# Patient Record
Sex: FEMALE | Race: WHITE | Employment: FULL TIME | URBAN - METROPOLITAN AREA
[De-identification: names, ages, dates, MRNs, and addresses within clinical notes are randomized per-mention and may not be internally consistent; named-entity substitution may affect disease eponyms.]

---

## 2018-05-02 ENCOUNTER — OFFICE VISIT (OUTPATIENT)
Dept: FAMILY MEDICINE CLINIC | Age: 23
End: 2018-05-02
Payer: COMMERCIAL

## 2018-05-02 VITALS
HEIGHT: 64 IN | OXYGEN SATURATION: 100 % | SYSTOLIC BLOOD PRESSURE: 117 MMHG | BODY MASS INDEX: 25.92 KG/M2 | RESPIRATION RATE: 20 BRPM | WEIGHT: 151.8 LBS | TEMPERATURE: 97.2 F | HEART RATE: 80 BPM | DIASTOLIC BLOOD PRESSURE: 73 MMHG

## 2018-05-02 DIAGNOSIS — F90.2 ATTENTION DEFICIT HYPERACTIVITY DISORDER (ADHD), COMBINED TYPE: ICD-10-CM

## 2018-05-02 DIAGNOSIS — R51.9 FREQUENT HEADACHES: ICD-10-CM

## 2018-05-02 DIAGNOSIS — Q74.1 PATELLA DYSPLASIA: ICD-10-CM

## 2018-05-02 DIAGNOSIS — Z23 NEED FOR VACCINATION: ICD-10-CM

## 2018-05-02 DIAGNOSIS — L50.9 URTICARIA: ICD-10-CM

## 2018-05-02 DIAGNOSIS — Z00.00 ANNUAL PHYSICAL EXAM: Primary | ICD-10-CM

## 2018-05-02 DIAGNOSIS — Z11.4 ENCOUNTER FOR SCREENING FOR HIV: ICD-10-CM

## 2018-05-02 DIAGNOSIS — F41.1 GAD (GENERALIZED ANXIETY DISORDER): ICD-10-CM

## 2018-05-02 DIAGNOSIS — T78.3XXA ANGIOEDEMA, INITIAL ENCOUNTER: ICD-10-CM

## 2018-05-02 PROCEDURE — 90471 IMMUNIZATION ADMIN: CPT | Performed by: FAMILY MEDICINE

## 2018-05-02 PROCEDURE — 99385 PREV VISIT NEW AGE 18-39: CPT | Performed by: FAMILY MEDICINE

## 2018-05-02 PROCEDURE — 90715 TDAP VACCINE 7 YRS/> IM: CPT | Performed by: FAMILY MEDICINE

## 2018-05-02 RX ORDER — LIDOCAINE 40 MG/G
CREAM TOPICAL
Qty: 45 G | Refills: 3 | Status: SHIPPED | OUTPATIENT
Start: 2018-05-02 | End: 2018-12-05

## 2018-05-02 RX ORDER — BACLOFEN 10 MG/1
10 TABLET ORAL DAILY
Qty: 30 TABLET | Refills: 1 | Status: SHIPPED | OUTPATIENT
Start: 2018-05-02 | End: 2018-12-05

## 2018-05-02 RX ORDER — EPINEPHRINE 0.3 MG/.3ML
0.3 INJECTION SUBCUTANEOUS PRN
Qty: 0.3 ML | Refills: 3 | Status: SHIPPED | OUTPATIENT
Start: 2018-05-02

## 2018-05-02 RX ORDER — DEXTROAMPHETAMINE SACCHARATE, AMPHETAMINE ASPARTATE MONOHYDRATE, DEXTROAMPHETAMINE SULFATE AND AMPHETAMINE SULFATE 5; 5; 5; 5 MG/1; MG/1; MG/1; MG/1
20 CAPSULE, EXTENDED RELEASE ORAL 2 TIMES DAILY
COMMUNITY
End: 2020-08-14

## 2018-05-02 RX ORDER — EPINEPHRINE 0.3 MG/.3ML
0.3 INJECTION SUBCUTANEOUS PRN
COMMUNITY
End: 2020-03-31 | Stop reason: SDUPTHER

## 2018-05-02 RX ORDER — BUPROPION HYDROCHLORIDE 300 MG/1
300 TABLET ORAL EVERY MORNING
COMMUNITY

## 2018-05-02 ASSESSMENT — PATIENT HEALTH QUESTIONNAIRE - PHQ9
2. FEELING DOWN, DEPRESSED OR HOPELESS: 0
1. LITTLE INTEREST OR PLEASURE IN DOING THINGS: 0
SUM OF ALL RESPONSES TO PHQ QUESTIONS 1-9: 0
SUM OF ALL RESPONSES TO PHQ9 QUESTIONS 1 & 2: 0

## 2018-05-02 ASSESSMENT — ENCOUNTER SYMPTOMS
WHEEZING: 0
DIARRHEA: 0
EYE REDNESS: 0
CONSTIPATION: 0
SINUS PRESSURE: 0
CHEST TIGHTNESS: 0
ABDOMINAL PAIN: 0
SHORTNESS OF BREATH: 0
BACK PAIN: 0
COUGH: 0
ABDOMINAL DISTENTION: 0
PHOTOPHOBIA: 0
SORE THROAT: 0
NAUSEA: 0
SINUS PAIN: 0
RHINORRHEA: 0

## 2018-06-11 ENCOUNTER — HOSPITAL ENCOUNTER (OUTPATIENT)
Age: 23
Discharge: HOME | End: 2018-06-11

## 2018-06-11 LAB — RUBV IGG SER QL: 330.4 IU/ML

## 2018-06-11 PROCEDURE — 86735 MUMPS ANTIBODY: CPT

## 2018-06-11 PROCEDURE — 86762 RUBELLA ANTIBODY: CPT

## 2018-06-11 PROCEDURE — 86787 VARICELLA-ZOSTER ANTIBODY: CPT

## 2018-06-11 PROCEDURE — 86481 TB AG RESPONSE T-CELL SUSP: CPT

## 2018-06-11 PROCEDURE — 86765 RUBEOLA ANTIBODY: CPT

## 2018-06-12 LAB
MEASLES IMMUNE (IGG): 2.53
MUV IGG SER QL: 2.2
VZV IGG SER QL IA: 1.91

## 2018-06-15 LAB — T-SPOT TB TEST: NORMAL

## 2018-11-19 ENCOUNTER — OFFICE VISIT (OUTPATIENT)
Dept: DERMATOLOGY | Age: 23
End: 2018-11-19
Payer: COMMERCIAL

## 2018-11-19 VITALS
HEART RATE: 91 BPM | DIASTOLIC BLOOD PRESSURE: 81 MMHG | OXYGEN SATURATION: 98 % | BODY MASS INDEX: 23.82 KG/M2 | SYSTOLIC BLOOD PRESSURE: 122 MMHG | HEIGHT: 66 IN | WEIGHT: 148.2 LBS

## 2018-11-19 DIAGNOSIS — D22.9 MULTIPLE NEVI: Primary | ICD-10-CM

## 2018-11-19 DIAGNOSIS — D22.5 NEVUS OF GROIN: ICD-10-CM

## 2018-11-19 PROCEDURE — 99202 OFFICE O/P NEW SF 15 MIN: CPT | Performed by: DERMATOLOGY

## 2018-11-19 NOTE — LETTER
[facility]) Dermatology   [address] [place] [address]  [address]  [address] [address]  Phone: [phone]  Fax: [phone]     Liv Hand MD       November 19, 2018     No referring provider defined for this encounter. Patient: Lissy Collins   MR Number: [id number]   YOB: 1995   Date of Visit: 11/19/2018     Dear Dr. Robert Castillo ref. provider found: Thank you for the request for consultation for Ms. Emmanuel Mott to me for evaluation. Below are the relevant portions of my assessment and plan of care. 1. Multiple nevi  - benign appearing on exam, many have red hue/congenital appearing  - two appear inflamed on left upper arm, but otherwise no concerning features  - left buttock largest with eccentric brown macule, will photograph and monitor  - discussed ABCDEs of melenoma  - Counseled on sun protection: avoidance, seek shade; use clothing/hats/scarves; use generous quantity of sunscreen, re-apply every 2 hours; vit D discussed. 2. Nevus of groin (right labial majora with two IDNs)  - benign appearing  - counseling    3. Scars with repigmentation  - left thigh with pigment outside of scar, suspect from incomplete shave  - requesting path reports from Dr. Henrietta Messer to determine if further treatment required    RTC 1 year, call if concerning lesions       Patient Instructions   Moles    Moles, or nevi, are very common. Moles are areas of the skin where there are more cells called melanocytes. Melanocytes are the cells in the body that produce pigment, or color. Moles can be many colors including skin-tone, pink, tan, brown, and very dark brown to black. Moles can be raised or flat. Moles can have hair. Moles can grow on any skin surface, including the scalp, hands and feet. When someone is born with a mole, or develops one in the first months of life, the mole is called a congenital, or birthmark mole. About 1 in 100 people are born with one or more moles. Most people develop their moles later in childhood or adulthood. These are called acquired moles. They are most common on sun exposed areas of skin such as the face, neck, upper body, arms and legs. CHECKING MOLES  Most moles are harmless, but in rare cases moles may become cancerous. Checking moles and looking for changes is an important step in helping to catch worrisome changes early. Some changes to look for are asymmetry (moles that do not look the same on each half), irregular shapes or borders, uneven color or large size. Also look for any moles that bleed, itch, or become painful. Looking at your skin regularly can help you recognize moles that are more at risk for becoming cancerous. WHEN TO CALL THE DOCTOR  Call your doctor if you see any of the following changes in a mole:       Irregular borders (uneven shape or edges)       Changes in color to black, blue or red.      Changes in the surface texture       Scabs, scaling, irritation or bleeding in the mole    TREATMENT FOR MOLES  Often we can simply look at your moles and tell you if they look worrisome. If we are not concerned about the look of your moles at your appointment, we may measure some moles and take some photos that will allow us to watch for future changes in the moles. TREATMENT FOR MOLES  If a mole is getting irritated frequently, bleeding, difficult to watch due to location or dark color, atypical in appearance, or worrisome, we may perform a skin biopsy. A skin biopsy is a procedure that involves removing the mole so that it can be looked at under a microscope. There are many methods used to remove moles. The method we choose depends on the location of the mole, the size of the mole, and the amount of concern for skin cancer. Generally, removing moles in the dermatologists office is a simple and safe procedure that can be done with local anesthesia.     PREVENTION You can do some things to prevent moles from becoming cancerous:       Try to avoid long periods of time in the sun and severe sunburns. The sun is        especially dangerous between 10:00 am and 4:00 pm.       Use a broad spectrum, water-resistant sun block lotion with an SPF of 30 or        greater. A broad spectrum lotion blocks both UVA and UVB rays from the sun. Re-apply sunscreen at least every 2 hours and after swimming or sweating.      Take advantage of shade whenever possible. Wear a broad-brimmed hat,        sunglasses, and protective clothing when outdoors.      Do not use tanning beds. Follow up in one year        If you have questions, please do not hesitate to call me. I look forward to following Genet along with you.     Sincerely,        Berna Mahan MD

## 2018-11-19 NOTE — PROGRESS NOTES
can be provided that every mistake has been identified and corrected byediting.     Electronically signed by Roddy Rodriguez MD on 11/19/18 at 8:55 AM

## 2018-11-29 ENCOUNTER — HOSPITAL ENCOUNTER (INPATIENT)
Age: 23
LOS: 4 days | Discharge: HOME | DRG: 690 | End: 2018-12-03
Attending: EMERGENCY MEDICINE | Admitting: INTERNAL MEDICINE
Payer: COMMERCIAL

## 2018-11-29 ENCOUNTER — APPOINTMENT (OUTPATIENT)
Dept: CT IMAGING | Age: 23
DRG: 690 | End: 2018-11-29
Payer: COMMERCIAL

## 2018-11-29 DIAGNOSIS — N12 PYELONEPHRITIS: Primary | ICD-10-CM

## 2018-11-29 DIAGNOSIS — R11.2 NAUSEA VOMITING AND DIARRHEA: ICD-10-CM

## 2018-11-29 DIAGNOSIS — R19.7 NAUSEA VOMITING AND DIARRHEA: ICD-10-CM

## 2018-11-29 LAB
-: ABNORMAL
ALBUMIN SERPL-MCNC: 4.4 G/DL (ref 3.5–5.2)
ALBUMIN/GLOBULIN RATIO: ABNORMAL (ref 1–2.5)
ALP BLD-CCNC: 52 U/L (ref 35–104)
ALT SERPL-CCNC: 9 U/L (ref 5–33)
AMORPHOUS: ABNORMAL
ANION GAP SERPL CALCULATED.3IONS-SCNC: 13 MMOL/L (ref 9–17)
AST SERPL-CCNC: 11 U/L
BACTERIA: ABNORMAL
BILIRUB SERPL-MCNC: 0.75 MG/DL (ref 0.3–1.2)
BILIRUBIN URINE: ABNORMAL
BUN BLDV-MCNC: 15 MG/DL (ref 6–20)
BUN/CREAT BLD: ABNORMAL (ref 9–20)
CALCIUM SERPL-MCNC: 8.8 MG/DL (ref 8.6–10.4)
CASTS UA: ABNORMAL /LPF
CHLORIDE BLD-SCNC: 105 MMOL/L (ref 98–107)
CO2: 23 MMOL/L (ref 20–31)
COLOR: ABNORMAL
COMMENT UA: ABNORMAL
CREAT SERPL-MCNC: 0.78 MG/DL (ref 0.5–0.9)
CRYSTALS, UA: ABNORMAL /HPF
DIRECT EXAM: NORMAL
EPITHELIAL CELLS UA: ABNORMAL /HPF
GFR AFRICAN AMERICAN: >60 ML/MIN
GFR NON-AFRICAN AMERICAN: >60 ML/MIN
GFR SERPL CREATININE-BSD FRML MDRD: ABNORMAL ML/MIN/{1.73_M2}
GFR SERPL CREATININE-BSD FRML MDRD: ABNORMAL ML/MIN/{1.73_M2}
GLUCOSE BLD-MCNC: 111 MG/DL (ref 70–99)
GLUCOSE URINE: NEGATIVE
HCG(URINE) PREGNANCY TEST: NEGATIVE
HCT VFR BLD CALC: 43.5 % (ref 36–46)
HEMOGLOBIN: 14.5 G/DL (ref 12–16)
KETONES, URINE: ABNORMAL
LEUKOCYTE ESTERASE, URINE: ABNORMAL
LIPASE: 21 U/L (ref 13–60)
Lab: NORMAL
MCH RBC QN AUTO: 29.3 PG (ref 26–34)
MCHC RBC AUTO-ENTMCNC: 33.4 G/DL (ref 31–37)
MCV RBC AUTO: 87.7 FL (ref 80–100)
MUCUS: ABNORMAL
NITRITE, URINE: NEGATIVE
NRBC AUTOMATED: ABNORMAL PER 100 WBC
OTHER OBSERVATIONS UA: ABNORMAL
PDW BLD-RTO: 12.9 % (ref 11.5–14.9)
PH UA: 5.5 (ref 5–8)
PLATELET # BLD: 276 K/UL (ref 150–450)
PMV BLD AUTO: 8.4 FL (ref 6–12)
POTASSIUM SERPL-SCNC: 4.3 MMOL/L (ref 3.7–5.3)
PROTEIN UA: NEGATIVE
RBC # BLD: 4.96 M/UL (ref 4–5.2)
RBC UA: ABNORMAL /HPF
RENAL EPITHELIAL, UA: ABNORMAL /HPF
SODIUM BLD-SCNC: 141 MMOL/L (ref 135–144)
SPECIFIC GRAVITY UA: 1.03 (ref 1–1.03)
SPECIMEN DESCRIPTION: NORMAL
STATUS: NORMAL
TOTAL PROTEIN: 7.5 G/DL (ref 6.4–8.3)
TRICHOMONAS: ABNORMAL
TURBIDITY: CLEAR
URINE HGB: ABNORMAL
UROBILINOGEN, URINE: NORMAL
WBC # BLD: 19.5 K/UL (ref 3.5–11)
WBC UA: ABNORMAL /HPF
YEAST: ABNORMAL

## 2018-11-29 PROCEDURE — G0378 HOSPITAL OBSERVATION PER HR: HCPCS

## 2018-11-29 PROCEDURE — 96376 TX/PRO/DX INJ SAME DRUG ADON: CPT

## 2018-11-29 PROCEDURE — 83690 ASSAY OF LIPASE: CPT

## 2018-11-29 PROCEDURE — 96374 THER/PROPH/DIAG INJ IV PUSH: CPT

## 2018-11-29 PROCEDURE — 85027 COMPLETE CBC AUTOMATED: CPT

## 2018-11-29 PROCEDURE — 1200000000 HC SEMI PRIVATE

## 2018-11-29 PROCEDURE — 6370000000 HC RX 637 (ALT 250 FOR IP): Performed by: EMERGENCY MEDICINE

## 2018-11-29 PROCEDURE — 2580000003 HC RX 258: Performed by: EMERGENCY MEDICINE

## 2018-11-29 PROCEDURE — 84703 CHORIONIC GONADOTROPIN ASSAY: CPT

## 2018-11-29 PROCEDURE — 81001 URINALYSIS AUTO W/SCOPE: CPT

## 2018-11-29 PROCEDURE — 36415 COLL VENOUS BLD VENIPUNCTURE: CPT

## 2018-11-29 PROCEDURE — 80053 COMPREHEN METABOLIC PANEL: CPT

## 2018-11-29 PROCEDURE — 87804 INFLUENZA ASSAY W/OPTIC: CPT

## 2018-11-29 PROCEDURE — 6360000002 HC RX W HCPCS: Performed by: NURSE PRACTITIONER

## 2018-11-29 PROCEDURE — 2580000003 HC RX 258: Performed by: INTERNAL MEDICINE

## 2018-11-29 PROCEDURE — 74176 CT ABD & PELVIS W/O CONTRAST: CPT

## 2018-11-29 PROCEDURE — 99285 EMERGENCY DEPT VISIT HI MDM: CPT

## 2018-11-29 PROCEDURE — 6360000002 HC RX W HCPCS: Performed by: EMERGENCY MEDICINE

## 2018-11-29 PROCEDURE — 96375 TX/PRO/DX INJ NEW DRUG ADDON: CPT

## 2018-11-29 RX ORDER — 0.9 % SODIUM CHLORIDE 0.9 %
500 INTRAVENOUS SOLUTION INTRAVENOUS ONCE
Status: COMPLETED | OUTPATIENT
Start: 2018-11-29 | End: 2018-11-29

## 2018-11-29 RX ORDER — MORPHINE SULFATE 4 MG/ML
4 INJECTION, SOLUTION INTRAMUSCULAR; INTRAVENOUS EVERY 4 HOURS PRN
Status: DISCONTINUED | OUTPATIENT
Start: 2018-11-29 | End: 2018-12-03 | Stop reason: HOSPADM

## 2018-11-29 RX ORDER — MORPHINE SULFATE 2 MG/ML
2 INJECTION, SOLUTION INTRAMUSCULAR; INTRAVENOUS ONCE
Status: COMPLETED | OUTPATIENT
Start: 2018-11-29 | End: 2018-11-29

## 2018-11-29 RX ORDER — DICYCLOMINE HYDROCHLORIDE 10 MG/1
10 CAPSULE ORAL ONCE
Status: COMPLETED | OUTPATIENT
Start: 2018-11-29 | End: 2018-11-29

## 2018-11-29 RX ORDER — ONDANSETRON 2 MG/ML
4 INJECTION INTRAMUSCULAR; INTRAVENOUS ONCE
Status: COMPLETED | OUTPATIENT
Start: 2018-11-29 | End: 2018-11-29

## 2018-11-29 RX ORDER — KETOROLAC TROMETHAMINE 30 MG/ML
15 INJECTION, SOLUTION INTRAMUSCULAR; INTRAVENOUS ONCE
Status: COMPLETED | OUTPATIENT
Start: 2018-11-29 | End: 2018-11-29

## 2018-11-29 RX ORDER — SULFAMETHOXAZOLE AND TRIMETHOPRIM 800; 160 MG/1; MG/1
1 TABLET ORAL ONCE
Status: COMPLETED | OUTPATIENT
Start: 2018-11-29 | End: 2018-11-29

## 2018-11-29 RX ORDER — SODIUM CHLORIDE 0.9 % (FLUSH) 0.9 %
10 SYRINGE (ML) INJECTION EVERY 12 HOURS SCHEDULED
Status: DISCONTINUED | OUTPATIENT
Start: 2018-11-29 | End: 2018-12-03 | Stop reason: HOSPADM

## 2018-11-29 RX ORDER — SODIUM CHLORIDE 9 MG/ML
INJECTION, SOLUTION INTRAVENOUS CONTINUOUS
Status: DISCONTINUED | OUTPATIENT
Start: 2018-11-29 | End: 2018-12-03 | Stop reason: HOSPADM

## 2018-11-29 RX ORDER — ACETAMINOPHEN 325 MG/1
650 TABLET ORAL EVERY 4 HOURS PRN
Status: DISCONTINUED | OUTPATIENT
Start: 2018-11-29 | End: 2018-12-03 | Stop reason: HOSPADM

## 2018-11-29 RX ORDER — ONDANSETRON 2 MG/ML
4 INJECTION INTRAMUSCULAR; INTRAVENOUS EVERY 4 HOURS PRN
Status: DISCONTINUED | OUTPATIENT
Start: 2018-11-29 | End: 2018-12-01

## 2018-11-29 RX ORDER — MORPHINE SULFATE 4 MG/ML
4 INJECTION, SOLUTION INTRAMUSCULAR; INTRAVENOUS ONCE
Status: COMPLETED | OUTPATIENT
Start: 2018-11-29 | End: 2018-11-29

## 2018-11-29 RX ORDER — 0.9 % SODIUM CHLORIDE 0.9 %
1000 INTRAVENOUS SOLUTION INTRAVENOUS ONCE
Status: COMPLETED | OUTPATIENT
Start: 2018-11-29 | End: 2018-11-29

## 2018-11-29 RX ORDER — KETOROLAC TROMETHAMINE 30 MG/ML
30 INJECTION, SOLUTION INTRAMUSCULAR; INTRAVENOUS EVERY 6 HOURS PRN
Status: DISCONTINUED | OUTPATIENT
Start: 2018-11-29 | End: 2018-12-03 | Stop reason: HOSPADM

## 2018-11-29 RX ORDER — SODIUM CHLORIDE 0.9 % (FLUSH) 0.9 %
10 SYRINGE (ML) INJECTION PRN
Status: DISCONTINUED | OUTPATIENT
Start: 2018-11-29 | End: 2018-12-03 | Stop reason: HOSPADM

## 2018-11-29 RX ORDER — PROMETHAZINE HYDROCHLORIDE 25 MG/ML
12.5 INJECTION, SOLUTION INTRAMUSCULAR; INTRAVENOUS EVERY 4 HOURS PRN
Status: DISCONTINUED | OUTPATIENT
Start: 2018-11-29 | End: 2018-12-03 | Stop reason: HOSPADM

## 2018-11-29 RX ADMIN — SODIUM CHLORIDE 1000 ML: 9 INJECTION, SOLUTION INTRAVENOUS at 19:29

## 2018-11-29 RX ADMIN — SODIUM CHLORIDE: 9 INJECTION, SOLUTION INTRAVENOUS at 23:48

## 2018-11-29 RX ADMIN — SODIUM CHLORIDE 500 ML: 9 INJECTION, SOLUTION INTRAVENOUS at 20:52

## 2018-11-29 RX ADMIN — MORPHINE SULFATE 2 MG: 2 INJECTION, SOLUTION INTRAMUSCULAR; INTRAVENOUS at 20:48

## 2018-11-29 RX ADMIN — DICYCLOMINE HYDROCHLORIDE 10 MG: 10 CAPSULE ORAL at 20:47

## 2018-11-29 RX ADMIN — ONDANSETRON 4 MG: 2 INJECTION INTRAMUSCULAR; INTRAVENOUS at 19:29

## 2018-11-29 RX ADMIN — KETOROLAC TROMETHAMINE 15 MG: 30 INJECTION, SOLUTION INTRAMUSCULAR at 19:29

## 2018-11-29 RX ADMIN — SULFAMETHOXAZOLE AND TRIMETHOPRIM 1 TABLET: 800; 160 TABLET ORAL at 20:47

## 2018-11-29 RX ADMIN — PROMETHAZINE HYDROCHLORIDE 12.5 MG: 25 INJECTION INTRAMUSCULAR; INTRAVENOUS at 23:48

## 2018-11-29 RX ADMIN — Medication 10 ML: at 23:48

## 2018-11-29 RX ADMIN — MORPHINE SULFATE 4 MG: 4 INJECTION INTRAVENOUS at 21:44

## 2018-11-29 RX ADMIN — ONDANSETRON 4 MG: 2 INJECTION INTRAMUSCULAR; INTRAVENOUS at 21:10

## 2018-11-29 ASSESSMENT — ENCOUNTER SYMPTOMS
ABDOMINAL PAIN: 1
BACK PAIN: 1
SHORTNESS OF BREATH: 0
NAUSEA: 1
DIARRHEA: 1
VOMITING: 1

## 2018-11-29 ASSESSMENT — PAIN SCALES - GENERAL
PAINLEVEL_OUTOF10: 8
PAINLEVEL_OUTOF10: 8
PAINLEVEL_OUTOF10: 7
PAINLEVEL_OUTOF10: 8
PAINLEVEL_OUTOF10: 9

## 2018-11-29 ASSESSMENT — PAIN DESCRIPTION - PAIN TYPE: TYPE: ACUTE PAIN

## 2018-11-29 ASSESSMENT — PAIN DESCRIPTION - LOCATION: LOCATION: ABDOMEN;BACK

## 2018-11-30 PROCEDURE — 1200000000 HC SEMI PRIVATE

## 2018-11-30 PROCEDURE — 2580000003 HC RX 258: Performed by: INTERNAL MEDICINE

## 2018-11-30 PROCEDURE — 2580000003 HC RX 258: Performed by: NURSE PRACTITIONER

## 2018-11-30 PROCEDURE — 99220 PR INITIAL OBSERVATION CARE/DAY 70 MINUTES: CPT | Performed by: INTERNAL MEDICINE

## 2018-11-30 PROCEDURE — 6360000002 HC RX W HCPCS: Performed by: NURSE PRACTITIONER

## 2018-11-30 RX ORDER — NORETHINDRONE ACETATE AND ETHINYL ESTRADIOL 1.5-30(21)
1 KIT ORAL DAILY
Status: DISCONTINUED | OUTPATIENT
Start: 2018-11-30 | End: 2018-12-03 | Stop reason: HOSPADM

## 2018-11-30 RX ORDER — DEXTROAMPHETAMINE SACCHARATE, AMPHETAMINE ASPARTATE MONOHYDRATE, DEXTROAMPHETAMINE SULFATE AND AMPHETAMINE SULFATE 5; 5; 5; 5 MG/1; MG/1; MG/1; MG/1
20 CAPSULE, EXTENDED RELEASE ORAL 2 TIMES DAILY
Status: DISCONTINUED | OUTPATIENT
Start: 2018-11-30 | End: 2018-12-01

## 2018-11-30 RX ORDER — SWAB
1 SWAB, NON-MEDICATED MISCELLANEOUS DAILY
Status: DISCONTINUED | OUTPATIENT
Start: 2018-11-30 | End: 2018-12-03 | Stop reason: HOSPADM

## 2018-11-30 RX ORDER — BUPROPION HYDROCHLORIDE 300 MG/1
300 TABLET ORAL EVERY MORNING
Status: DISCONTINUED | OUTPATIENT
Start: 2018-11-30 | End: 2018-12-03 | Stop reason: HOSPADM

## 2018-11-30 RX ORDER — DEXTROAMPHETAMINE SACCHARATE, AMPHETAMINE ASPARTATE MONOHYDRATE, DEXTROAMPHETAMINE SULFATE AND AMPHETAMINE SULFATE 2.5; 2.5; 2.5; 2.5 MG/1; MG/1; MG/1; MG/1
20 CAPSULE, EXTENDED RELEASE ORAL 2 TIMES DAILY
Status: DISCONTINUED | OUTPATIENT
Start: 2018-11-30 | End: 2018-11-30

## 2018-11-30 RX ADMIN — NORETHINDRONE ACETATE AND ETHINYL ESTRADIOL 1 TABLET: KIT at 11:20

## 2018-11-30 RX ADMIN — KETOROLAC TROMETHAMINE 30 MG: 30 INJECTION, SOLUTION INTRAMUSCULAR at 02:06

## 2018-11-30 RX ADMIN — DEXTROAMPHETAMINE SACCHARATE, AMPHETAMINE ASPARTATE MONOHYDRATE, DEXTROAMPHETAMINE SULFATE AND AMPHETAMINE SULFATE 20 MG: 5; 5; 5; 5 CAPSULE, EXTENDED RELEASE ORAL at 11:21

## 2018-11-30 RX ADMIN — ONDANSETRON 4 MG: 2 INJECTION INTRAMUSCULAR; INTRAVENOUS at 10:54

## 2018-11-30 RX ADMIN — MORPHINE SULFATE 4 MG: 4 INJECTION INTRAVENOUS at 14:53

## 2018-11-30 RX ADMIN — PROMETHAZINE HYDROCHLORIDE 12.5 MG: 25 INJECTION INTRAMUSCULAR; INTRAVENOUS at 13:35

## 2018-11-30 RX ADMIN — KETOROLAC TROMETHAMINE 30 MG: 30 INJECTION, SOLUTION INTRAMUSCULAR at 18:15

## 2018-11-30 RX ADMIN — CEFTRIAXONE SODIUM 1 G: 1 INJECTION, POWDER, FOR SOLUTION INTRAMUSCULAR; INTRAVENOUS at 06:28

## 2018-11-30 RX ADMIN — BUPROPION HYDROCHLORIDE 300 MG: 300 TABLET ORAL at 12:06

## 2018-11-30 RX ADMIN — MORPHINE SULFATE 4 MG: 4 INJECTION INTRAVENOUS at 06:26

## 2018-11-30 RX ADMIN — PROMETHAZINE HYDROCHLORIDE 12.5 MG: 25 INJECTION INTRAMUSCULAR; INTRAVENOUS at 08:18

## 2018-11-30 RX ADMIN — MORPHINE SULFATE 4 MG: 4 INJECTION INTRAVENOUS at 10:53

## 2018-11-30 RX ADMIN — SODIUM CHLORIDE: 9 INJECTION, SOLUTION INTRAVENOUS at 08:25

## 2018-11-30 RX ADMIN — KETOROLAC TROMETHAMINE 30 MG: 30 INJECTION, SOLUTION INTRAMUSCULAR at 08:21

## 2018-11-30 RX ADMIN — SODIUM CHLORIDE: 9 INJECTION, SOLUTION INTRAVENOUS at 18:06

## 2018-11-30 RX ADMIN — PROMETHAZINE HYDROCHLORIDE 12.5 MG: 25 INJECTION INTRAMUSCULAR; INTRAVENOUS at 18:15

## 2018-11-30 ASSESSMENT — PAIN DESCRIPTION - LOCATION
LOCATION: ABDOMEN;BACK
LOCATION: ABDOMEN;BACK

## 2018-11-30 ASSESSMENT — PAIN SCALES - GENERAL
PAINLEVEL_OUTOF10: 9
PAINLEVEL_OUTOF10: 4
PAINLEVEL_OUTOF10: 4
PAINLEVEL_OUTOF10: 5
PAINLEVEL_OUTOF10: 9
PAINLEVEL_OUTOF10: 5
PAINLEVEL_OUTOF10: 5
PAINLEVEL_OUTOF10: 9
PAINLEVEL_OUTOF10: 5
PAINLEVEL_OUTOF10: 8
PAINLEVEL_OUTOF10: 5
PAINLEVEL_OUTOF10: 9
PAINLEVEL_OUTOF10: 8
PAINLEVEL_OUTOF10: 5
PAINLEVEL_OUTOF10: 7
PAINLEVEL_OUTOF10: 8
PAINLEVEL_OUTOF10: 9

## 2018-11-30 ASSESSMENT — ENCOUNTER SYMPTOMS
BACK PAIN: 1
DIARRHEA: 1
SORE THROAT: 0
ABDOMINAL PAIN: 1
WHEEZING: 0
CONSTIPATION: 0
COUGH: 0
SHORTNESS OF BREATH: 0
NAUSEA: 1
VOMITING: 1

## 2018-11-30 ASSESSMENT — PAIN DESCRIPTION - DESCRIPTORS: DESCRIPTORS: SHARP

## 2018-11-30 ASSESSMENT — PAIN DESCRIPTION - PAIN TYPE
TYPE: ACUTE PAIN

## 2018-11-30 NOTE — PLAN OF CARE
Problem: Pain:  Goal: Pain level will decrease  Pain level will decrease   Outcome: Ongoing  Patient given pain medication as needed this shift  Goal: Control of acute pain  Control of acute pain   Outcome: Ongoing  Patient given pain medication as needed this shift  Goal: Control of chronic pain  Control of chronic pain   Outcome: Ongoing

## 2018-11-30 NOTE — ED PROVIDER NOTES
Otherwise no acute disease. LABS: All lab results were reviewed by myself, and all abnormals are listed below. Labs Reviewed   CBC - Abnormal; Notable for the following:        Result Value    WBC 19.5 (*)     All other components within normal limits   COMPREHENSIVE METABOLIC PANEL W/ REFLEX TO MG FOR LOW K - Abnormal; Notable for the following:     Glucose 111 (*)     All other components within normal limits   URINALYSIS - Abnormal; Notable for the following:     Color, UA DARK YELLOW (*)     Bilirubin Urine   (*)     Value: Presumptive positive. Unable to confirm due to unavailability of reagent. Ketones, Urine MOD (*)     Urine Hgb TRACE (*)     Leukocyte Esterase, Urine SMALL (*)     All other components within normal limits   MICROSCOPIC URINALYSIS - Abnormal; Notable for the following:     Bacteria, UA FEW (*)     Mucus, UA 3+ (*)     All other components within normal limits   RAPID INFLUENZA A/B ANTIGENS   LIPASE   PREGNANCY, URINE   ICTOTEST, URINE     EMERGENCY DEPARTMENTCOURSE:   Vitals:    Vitals:    11/29/18 1945 11/29/18 2145 11/29/18 2200 11/29/18 2215   BP: 112/65 112/61 110/62 106/64   Pulse:       Resp:       Temp:       TempSrc:       SpO2: 99% 96% 94% 96%   Weight:       Height:           The patient was given the following medications while in the emergency department:  Orders Placed This Encounter   Medications    0.9 % sodium chloride bolus    ondansetron (ZOFRAN) injection 4 mg    ketorolac (TORADOL) injection 15 mg    dicyclomine (BENTYL) capsule 10 mg    0.9 % sodium chloride bolus    sulfamethoxazole-trimethoprim (BACTRIM DS;SEPTRA DS) 800-160 MG per tablet 1 tablet    morphine injection 2 mg    ondansetron (ZOFRAN) injection 4 mg    morphine (PF) injection 4 mg     CONSULTS:  None    FINAL IMPRESSION      1. Pyelonephritis    2.  Nausea vomiting and diarrhea          DISPOSITION/PLAN   DISPOSITION Admitted 11/29/2018 10:36:46 PM      PATIENT REFERRED TO:  Mayda Frederick

## 2018-11-30 NOTE — H&P
[address]     HISTORY AND PHYSICAL EXAMINATION            Date:   11/30/2018  Patientname:  Sheron Beck  Date of admission:  11/29/2018  6:52 PM  MRN:   [id number]  Account:  [de-identified]  YOB: 1995  PCP:    Wilian Hand MD  Room:   2050/2050-01  Code Status:    Full Code    CHIEF COMPLAINT     Chief Complaint   Patient presents with    Abdominal Pain    Emesis    Diarrhea    Flank Pain     HISTORY OF PRESENT ILLNESS  (Character, Onset, Location, Duration,  Exacerbating/RelievingFactors, Radiation,   Associated Symptoms, Severity )      The patient is a 20 y.o.  female who presents with one day history of nausea, vomiting, diarrhea, and bilateral flank pain. According to patient, she was at work earlier today, when she began feeling overheated and became nauseated,  Reports that she left work and began vomiting and having diarrhea. Later in the afternoon, she developed bilateral lower back pain and chills, prompting her to come to the ED for evaluation. Symptoms are associated with abdominal pain and flank pain, bilat. Denies fever, chest pain, cough, and urinary symptoms. Denies previous episodes of similar symptoms. There are no aggravating or alleviating factors. Symptoms are reported as constant and moderate. PAST MEDICAL HISTORY   Patient  has a past medical history of ADHD (attention deficit hyperactivity disorder); Anxiety; Headache; and Nodulocystic acne. PAST SURGICAL HISTORY    Patient  has a past surgical history that includes knee surgery. FAMILY HISTORY    Patient family history includes Cancer in her maternal grandfather; High Cholesterol in her father; Other in her father and mother. SOCIAL HISTORY    Patient  reports that she has never smoked. She has never used smokeless tobacco. She reports that she does not drink alcohol or use drugs.      HOME MEDICATIONS        Prior to Admission medications    Medication Sig

## 2018-12-01 PROCEDURE — 1200000000 HC SEMI PRIVATE

## 2018-12-01 PROCEDURE — 99226 PR SBSQ OBSERVATION CARE/DAY 35 MINUTES: CPT | Performed by: INTERNAL MEDICINE

## 2018-12-01 PROCEDURE — 2580000003 HC RX 258: Performed by: INTERNAL MEDICINE

## 2018-12-01 PROCEDURE — 6370000000 HC RX 637 (ALT 250 FOR IP): Performed by: NURSE PRACTITIONER

## 2018-12-01 PROCEDURE — 2500000003 HC RX 250 WO HCPCS: Performed by: INTERNAL MEDICINE

## 2018-12-01 PROCEDURE — 6360000002 HC RX W HCPCS: Performed by: NURSE PRACTITIONER

## 2018-12-01 PROCEDURE — 2580000003 HC RX 258: Performed by: NURSE PRACTITIONER

## 2018-12-01 RX ORDER — ONDANSETRON 2 MG/ML
8 INJECTION INTRAMUSCULAR; INTRAVENOUS EVERY 4 HOURS PRN
Status: DISCONTINUED | OUTPATIENT
Start: 2018-12-01 | End: 2018-12-03 | Stop reason: HOSPADM

## 2018-12-01 RX ORDER — METRONIDAZOLE 500 MG/1
250 TABLET ORAL EVERY 8 HOURS SCHEDULED
Status: DISCONTINUED | OUTPATIENT
Start: 2018-12-01 | End: 2018-12-01

## 2018-12-01 RX ORDER — DEXTROAMPHETAMINE SACCHARATE, AMPHETAMINE ASPARTATE MONOHYDRATE, DEXTROAMPHETAMINE SULFATE AND AMPHETAMINE SULFATE 5; 5; 5; 5 MG/1; MG/1; MG/1; MG/1
20 CAPSULE, EXTENDED RELEASE ORAL 2 TIMES DAILY
Status: DISCONTINUED | OUTPATIENT
Start: 2018-12-01 | End: 2018-12-03 | Stop reason: HOSPADM

## 2018-12-01 RX ADMIN — DEXTROAMPHETAMINE SACCHARATE, AMPHETAMINE ASPARTATE MONOHYDRATE, DEXTROAMPHETAMINE SULFATE AND AMPHETAMINE SULFATE 20 MG: 5; 5; 5; 5 CAPSULE, EXTENDED RELEASE ORAL at 09:57

## 2018-12-01 RX ADMIN — METRONIDAZOLE 500 MG: 500 INJECTION, SOLUTION INTRAVENOUS at 15:00

## 2018-12-01 RX ADMIN — MORPHINE SULFATE 4 MG: 4 INJECTION INTRAVENOUS at 06:59

## 2018-12-01 RX ADMIN — CHOLECALCIFEROL TAB 125 MCG (5000 UNIT) 5000 UNITS: 125 TAB at 05:31

## 2018-12-01 RX ADMIN — CEFTRIAXONE SODIUM 1 G: 1 INJECTION, POWDER, FOR SOLUTION INTRAMUSCULAR; INTRAVENOUS at 05:30

## 2018-12-01 RX ADMIN — SODIUM CHLORIDE: 9 INJECTION, SOLUTION INTRAVENOUS at 00:14

## 2018-12-01 RX ADMIN — MORPHINE SULFATE 4 MG: 4 INJECTION INTRAVENOUS at 21:12

## 2018-12-01 RX ADMIN — PROMETHAZINE HYDROCHLORIDE 12.5 MG: 25 INJECTION INTRAMUSCULAR; INTRAVENOUS at 00:23

## 2018-12-01 RX ADMIN — ONDANSETRON 4 MG: 2 INJECTION INTRAMUSCULAR; INTRAVENOUS at 09:00

## 2018-12-01 RX ADMIN — NORETHINDRONE ACETATE AND ETHINYL ESTRADIOL 1 TABLET: KIT at 09:58

## 2018-12-01 RX ADMIN — SODIUM CHLORIDE: 9 INJECTION, SOLUTION INTRAVENOUS at 09:00

## 2018-12-01 RX ADMIN — BUPROPION HYDROCHLORIDE 300 MG: 300 TABLET ORAL at 09:58

## 2018-12-01 RX ADMIN — KETOROLAC TROMETHAMINE 30 MG: 30 INJECTION, SOLUTION INTRAMUSCULAR at 00:22

## 2018-12-01 RX ADMIN — METRONIDAZOLE 500 MG: 500 INJECTION, SOLUTION INTRAVENOUS at 23:18

## 2018-12-01 RX ADMIN — MORPHINE SULFATE 4 MG: 4 INJECTION INTRAVENOUS at 11:19

## 2018-12-01 RX ADMIN — PROMETHAZINE HYDROCHLORIDE 12.5 MG: 25 INJECTION INTRAMUSCULAR; INTRAVENOUS at 21:13

## 2018-12-01 RX ADMIN — MORPHINE SULFATE 4 MG: 4 INJECTION INTRAVENOUS at 16:15

## 2018-12-01 RX ADMIN — PROMETHAZINE HYDROCHLORIDE 12.5 MG: 25 INJECTION INTRAMUSCULAR; INTRAVENOUS at 16:15

## 2018-12-01 RX ADMIN — KETOROLAC TROMETHAMINE 30 MG: 30 INJECTION, SOLUTION INTRAMUSCULAR at 17:58

## 2018-12-01 RX ADMIN — PROMETHAZINE HYDROCHLORIDE 12.5 MG: 25 INJECTION INTRAMUSCULAR; INTRAVENOUS at 11:18

## 2018-12-01 RX ADMIN — SODIUM CHLORIDE: 9 INJECTION, SOLUTION INTRAVENOUS at 15:03

## 2018-12-01 ASSESSMENT — PAIN DESCRIPTION - ORIENTATION
ORIENTATION: MID;LOWER
ORIENTATION: MID
ORIENTATION: MID

## 2018-12-01 ASSESSMENT — PAIN SCALES - GENERAL
PAINLEVEL_OUTOF10: 7
PAINLEVEL_OUTOF10: 9
PAINLEVEL_OUTOF10: 9
PAINLEVEL_OUTOF10: 7
PAINLEVEL_OUTOF10: 9
PAINLEVEL_OUTOF10: 5
PAINLEVEL_OUTOF10: 10
PAINLEVEL_OUTOF10: 9
PAINLEVEL_OUTOF10: 5
PAINLEVEL_OUTOF10: 8

## 2018-12-01 ASSESSMENT — ENCOUNTER SYMPTOMS
VOMITING: 1
CONSTIPATION: 0
COUGH: 0
NAUSEA: 1
ABDOMINAL PAIN: 1
WHEEZING: 0
BACK PAIN: 1
DIARRHEA: 1
SORE THROAT: 0
SHORTNESS OF BREATH: 0

## 2018-12-01 ASSESSMENT — PAIN DESCRIPTION - DESCRIPTORS
DESCRIPTORS: DULL;SHARP
DESCRIPTORS: SHARP

## 2018-12-01 ASSESSMENT — PAIN DESCRIPTION - ONSET: ONSET: ON-GOING

## 2018-12-01 ASSESSMENT — PAIN DESCRIPTION - LOCATION
LOCATION: ABDOMEN;BACK;FLANK
LOCATION: ABDOMEN;BACK;FLANK
LOCATION: BACK

## 2018-12-01 ASSESSMENT — PAIN DESCRIPTION - PAIN TYPE
TYPE: ACUTE PAIN

## 2018-12-01 ASSESSMENT — PAIN DESCRIPTION - FREQUENCY
FREQUENCY: CONTINUOUS
FREQUENCY: CONTINUOUS

## 2018-12-01 NOTE — PLAN OF CARE
Problem: Pain:  Goal: Pain level will decrease  Pain level will decrease   Outcome: Ongoing  Adequate pain control achieved this shift. See MAR. Problem: Falls - Risk of:  Goal: Will remain free from falls  Will remain free from falls   Outcome: Ongoing  Pt. Free of falls and injuries this shift.

## 2018-12-01 NOTE — H&P
normal. GI/Bowel: The stomache,small bowel, and colon appear normal. Appendix not identified. Pelvis: Uterus normal. Peritoneum/Retroperitoneum: The abdominal aorta and iliac arteries are normal in caliber. There is no pathologic adenopathy. Bones/Soft Tissues: Normal     Punctate nonobstructing nephrolith on the left. Otherwise no acute disease. ASSESSMENT  and  PLAN     Active Problems:    Pyelonephritis  Resolved Problems:    * No resolved hospital problems. *    Plan:    Pyelonephritis  -Patient with low back and bilateral flank pain  -CT -Punctate nonobstructing nephrolith on the left. -One dose Bactrim administered in ED  -Begin Rocephin 1 gm IV   -Pain and nausea control  -Strain urine     12/1   pyelonepritis   small nion obstructing stone    Cr nl    recheck cbc   on rocephin     collitis   add flagyl   ivf   increase zofran     Kimberly Prince MD   12/1/2018  11:48 AM    [address]  [address]    Phone [phone]

## 2018-12-02 LAB
ANION GAP SERPL CALCULATED.3IONS-SCNC: 10 MMOL/L (ref 9–17)
BUN BLDV-MCNC: 8 MG/DL (ref 6–20)
BUN/CREAT BLD: ABNORMAL (ref 9–20)
CALCIUM SERPL-MCNC: 8 MG/DL (ref 8.6–10.4)
CHLORIDE BLD-SCNC: 106 MMOL/L (ref 98–107)
CO2: 23 MMOL/L (ref 20–31)
CREAT SERPL-MCNC: 0.76 MG/DL (ref 0.5–0.9)
GFR AFRICAN AMERICAN: >60 ML/MIN
GFR NON-AFRICAN AMERICAN: >60 ML/MIN
GFR SERPL CREATININE-BSD FRML MDRD: ABNORMAL ML/MIN/{1.73_M2}
GFR SERPL CREATININE-BSD FRML MDRD: ABNORMAL ML/MIN/{1.73_M2}
GLUCOSE BLD-MCNC: 94 MG/DL (ref 70–99)
HCT VFR BLD CALC: 36.7 % (ref 36–46)
HEMOGLOBIN: 12 G/DL (ref 12–16)
MCH RBC QN AUTO: 28.7 PG (ref 26–34)
MCHC RBC AUTO-ENTMCNC: 32.8 G/DL (ref 31–37)
MCV RBC AUTO: 87.5 FL (ref 80–100)
NRBC AUTOMATED: NORMAL PER 100 WBC
PDW BLD-RTO: 12.9 % (ref 11.5–14.9)
PLATELET # BLD: 249 K/UL (ref 150–450)
PMV BLD AUTO: 7.9 FL (ref 6–12)
POTASSIUM SERPL-SCNC: 4.1 MMOL/L (ref 3.7–5.3)
RBC # BLD: 4.2 M/UL (ref 4–5.2)
SODIUM BLD-SCNC: 139 MMOL/L (ref 135–144)
WBC # BLD: 7.3 K/UL (ref 3.5–11)

## 2018-12-02 PROCEDURE — 6360000002 HC RX W HCPCS: Performed by: INTERNAL MEDICINE

## 2018-12-02 PROCEDURE — 99225 PR SBSQ OBSERVATION CARE/DAY 25 MINUTES: CPT | Performed by: INTERNAL MEDICINE

## 2018-12-02 PROCEDURE — 36415 COLL VENOUS BLD VENIPUNCTURE: CPT

## 2018-12-02 PROCEDURE — 6360000002 HC RX W HCPCS: Performed by: NURSE PRACTITIONER

## 2018-12-02 PROCEDURE — 2500000003 HC RX 250 WO HCPCS: Performed by: INTERNAL MEDICINE

## 2018-12-02 PROCEDURE — 2580000003 HC RX 258: Performed by: NURSE PRACTITIONER

## 2018-12-02 PROCEDURE — 85027 COMPLETE CBC AUTOMATED: CPT

## 2018-12-02 PROCEDURE — 1200000000 HC SEMI PRIVATE

## 2018-12-02 PROCEDURE — 6370000000 HC RX 637 (ALT 250 FOR IP): Performed by: NURSE PRACTITIONER

## 2018-12-02 PROCEDURE — 80048 BASIC METABOLIC PNL TOTAL CA: CPT

## 2018-12-02 PROCEDURE — 2580000003 HC RX 258: Performed by: INTERNAL MEDICINE

## 2018-12-02 RX ADMIN — CHOLECALCIFEROL TAB 125 MCG (5000 UNIT) 5000 UNITS: 125 TAB at 05:41

## 2018-12-02 RX ADMIN — CEFTRIAXONE SODIUM 1 G: 1 INJECTION, POWDER, FOR SOLUTION INTRAMUSCULAR; INTRAVENOUS at 05:38

## 2018-12-02 RX ADMIN — SODIUM CHLORIDE: 9 INJECTION, SOLUTION INTRAVENOUS at 09:18

## 2018-12-02 RX ADMIN — PROMETHAZINE HYDROCHLORIDE 12.5 MG: 25 INJECTION INTRAMUSCULAR; INTRAVENOUS at 13:18

## 2018-12-02 RX ADMIN — PROMETHAZINE HYDROCHLORIDE 12.5 MG: 25 INJECTION INTRAMUSCULAR; INTRAVENOUS at 17:24

## 2018-12-02 RX ADMIN — METRONIDAZOLE 500 MG: 500 INJECTION, SOLUTION INTRAVENOUS at 06:55

## 2018-12-02 RX ADMIN — SODIUM CHLORIDE: 9 INJECTION, SOLUTION INTRAVENOUS at 19:14

## 2018-12-02 RX ADMIN — SODIUM CHLORIDE: 9 INJECTION, SOLUTION INTRAVENOUS at 00:55

## 2018-12-02 RX ADMIN — MORPHINE SULFATE 4 MG: 4 INJECTION INTRAVENOUS at 09:15

## 2018-12-02 RX ADMIN — KETOROLAC TROMETHAMINE 30 MG: 30 INJECTION, SOLUTION INTRAMUSCULAR at 05:48

## 2018-12-02 RX ADMIN — PROMETHAZINE HYDROCHLORIDE 12.5 MG: 25 INJECTION INTRAMUSCULAR; INTRAVENOUS at 09:15

## 2018-12-02 RX ADMIN — METRONIDAZOLE 500 MG: 500 INJECTION, SOLUTION INTRAVENOUS at 15:45

## 2018-12-02 RX ADMIN — METRONIDAZOLE 500 MG: 500 INJECTION, SOLUTION INTRAVENOUS at 23:28

## 2018-12-02 RX ADMIN — ONDANSETRON 8 MG: 2 INJECTION INTRAMUSCULAR; INTRAVENOUS at 05:50

## 2018-12-02 RX ADMIN — MORPHINE SULFATE 4 MG: 4 INJECTION INTRAVENOUS at 13:18

## 2018-12-02 RX ADMIN — Medication 1 TABLET: at 21:07

## 2018-12-02 RX ADMIN — MORPHINE SULFATE 4 MG: 4 INJECTION INTRAVENOUS at 18:15

## 2018-12-02 ASSESSMENT — ENCOUNTER SYMPTOMS
VOMITING: 1
SORE THROAT: 0
WHEEZING: 0
BACK PAIN: 1
DIARRHEA: 1
COUGH: 0
ABDOMINAL PAIN: 1
SHORTNESS OF BREATH: 0
CONSTIPATION: 0
NAUSEA: 1

## 2018-12-02 ASSESSMENT — PAIN SCALES - GENERAL
PAINLEVEL_OUTOF10: 7
PAINLEVEL_OUTOF10: 9
PAINLEVEL_OUTOF10: 7
PAINLEVEL_OUTOF10: 7
PAINLEVEL_OUTOF10: 10
PAINLEVEL_OUTOF10: 8

## 2018-12-02 ASSESSMENT — PAIN DESCRIPTION - LOCATION
LOCATION: ABDOMEN
LOCATION: ABDOMEN

## 2018-12-02 ASSESSMENT — PAIN DESCRIPTION - PAIN TYPE
TYPE: ACUTE PAIN
TYPE: ACUTE PAIN

## 2018-12-02 ASSESSMENT — PAIN DESCRIPTION - ORIENTATION: ORIENTATION: LOWER;MID

## 2018-12-02 NOTE — H&P
Medication Sig Start Date End Date Taking? Authorizing Provider   Cholecalciferol (VITAMIN D3) 5000 units TABS Take 5,000 Units by mouth Daily   Yes Historical Provider, MD   amphetamine-dextroamphetamine (ADDERALL XR) 20 MG extended release capsule Take 20 mg by mouth 2 times daily. .   Yes Historical Provider, MD   buPROPion (WELLBUTRIN XL) 300 MG extended release tablet Take 300 mg by mouth every morning   Yes Historical Provider, MD   Prenatal Vit-Fe Fumarate-FA (PNV PRENATAL PLUS MULTIVITAMIN) 27-1 MG TABS  10/15/15  Yes Historical Provider, MD [place] FE 1.5/30 1.5-30 MG-MCG tablet Take 1 tablet by mouth daily  10/15/15  Yes Historical Provider, MD   EPINEPHrine (EPIPEN 2-FELIPE) 0.3 MG/0.3ML SOAJ injection Inject 0.3 mg into the muscle as needed Use as directed for allergic reaction    Historical Provider, MD   EPINEPHrine (EPIPEN 2-FELIPE) 0.3 MG/0.3ML SOAJ injection Inject 0.3 mLs into the skin as needed (during allergies) Use as directed for allergic reaction 5/2/18   Jordy Tristan MD   baclofen (LIORESAL) 10 MG tablet Take 1 tablet by mouth daily 5/2/18   Jordy Tristan MD   lidocaine (LMX) 4 % cream Apply topically every 8 hrs as needed for pain 5/2/18   Jordy Tristan MD     ALLERGIES      Patient has no known allergies. REVIEW OF SYSTEMS     Review of Systems   Constitutional: Negative for chills, diaphoresis and fever. HENT: Negative for congestion and sore throat. Respiratory: Negative for cough, shortness of breath and wheezing. Cardiovascular: Negative for chest pain, palpitations and leg swelling. Gastrointestinal: Positive for abdominal pain, diarrhea, nausea and vomiting. Negative for constipation. Genitourinary: Positive for flank pain. Negative for dysuria, frequency and urgency. Musculoskeletal: Positive for back pain. Negative for myalgias. Skin: Negative for rash. Neurological: Negative for dizziness, weakness and headaches.    Psychiatric/Behavioral: The patient is not nervous/anxious. PHYSICAL EXAM      BP (!) 110/53   Pulse 103   Temp 97.6 °F (36.4 °C) (Oral)   Resp 19   Ht 5' 6\" (1.676 m)   Wt 153 lb (69.4 kg)   SpO2 98%   BMI 24.69 kg/m²  Body mass index is 24.69 kg/m². Physical Exam   Constitutional: She is oriented to person, place, and time. She appears well-developed and well-nourished. No distress. HENT:   Head: Normocephalic and atraumatic. Mouth/Throat: Oropharynx is clear and moist.   Eyes: Pupils are equal, round, and reactive to light. Conjunctivae and EOM are normal.   Neck: Normal range of motion. Neck supple. No tracheal deviation present. Cardiovascular: Normal rate, regular rhythm, normal heart sounds and intact distal pulses. Exam reveals no gallop and no friction rub. No murmur heard. Pulmonary/Chest: Effort normal and breath sounds normal. No respiratory distress. She has no wheezes. She has no rales. She exhibits no tenderness. Abdominal: Soft. Bowel sounds are normal. She exhibits no distension. There is tenderness. There is no guarding. Musculoskeletal: Normal range of motion. She exhibits tenderness (bilateral CVA tenderness). She exhibits no edema. Lymphadenopathy:     She has no cervical adenopathy. Neurological: She is alert and oriented to person, place, and time. Skin: Skin is warm and dry. No rash noted. She is not diaphoretic. No erythema. No pallor. Psychiatric: She has a normal mood and affect. Her behavior is normal. Thought content normal.     DIAGNOSTICS      EKG: none    Labs:  CBC:   Recent Labs      11/29/18   1930  12/02/18   0715   WBC  19.5*  7.3   HGB  14.5  12.0   PLT  276  249     BMP:  @LABRCNT(Na:3,K:3,CL:3,CO2:3,BUN:3,Creatinine:3,Glucose:3)@  S. Calcium:  Recent Labs      12/02/18   0715   CALCIUM  8.0*     S. Ionized Calcium:No results for input(s): IONCA in the last 72 hours. S. Magnesium:No results for input(s): MG in the last 72 hours.   S. Phosphorus:No results for input(s): PHOS in left.  Right kidney normal. The bladder appears normal. GI/Bowel: The stomache,small bowel, and colon appear normal. Appendix not identified. Pelvis: Uterus normal. Peritoneum/Retroperitoneum: The abdominal aorta and iliac arteries are normal in caliber. There is no pathologic adenopathy. Bones/Soft Tissues: Normal     Punctate nonobstructing nephrolith on the left. Otherwise no acute disease. ASSESSMENT  and  PLAN     Active Problems:    Pyelonephritis  Resolved Problems:    * No resolved hospital problems. *    Plan:    Pyelonephritis  -Patient with low back and bilateral flank pain  -CT -Punctate nonobstructing nephrolith on the left. -One dose Bactrim administered in ED  -Begin Rocephin 1 gm IV   -Pain and nausea control  -Strain urine     12/1   pyelonepritis   small nion obstructing stone    Cr nl    recheck cbc   on rocephin     collitis   add flagyl   ivf   increase zofran      12/2     still has nausea     abd pain betetr   wbc nl no fever     cont same    as still has n/v    Cont iv atb    cont ivf        Rayburn Bernheim, MD   12/2/2018  11:50 AM    [facility] - [address], [address] [place] [address].    Phone [phone]

## 2018-12-03 ENCOUNTER — TELEPHONE (OUTPATIENT)
Dept: FAMILY MEDICINE CLINIC | Age: 23
End: 2018-12-03

## 2018-12-03 VITALS
TEMPERATURE: 98.3 F | DIASTOLIC BLOOD PRESSURE: 63 MMHG | WEIGHT: 153 LBS | HEIGHT: 66 IN | OXYGEN SATURATION: 99 % | SYSTOLIC BLOOD PRESSURE: 112 MMHG | HEART RATE: 99 BPM | BODY MASS INDEX: 24.59 KG/M2 | RESPIRATION RATE: 18 BRPM

## 2018-12-03 LAB
ANION GAP SERPL CALCULATED.3IONS-SCNC: 6 MMOL/L (ref 9–17)
BUN BLDV-MCNC: 8 MG/DL (ref 6–20)
BUN/CREAT BLD: ABNORMAL (ref 9–20)
CALCIUM SERPL-MCNC: 8.3 MG/DL (ref 8.6–10.4)
CHLORIDE BLD-SCNC: 109 MMOL/L (ref 98–107)
CO2: 25 MMOL/L (ref 20–31)
CREAT SERPL-MCNC: 0.87 MG/DL (ref 0.5–0.9)
GFR AFRICAN AMERICAN: >60 ML/MIN
GFR NON-AFRICAN AMERICAN: >60 ML/MIN
GFR SERPL CREATININE-BSD FRML MDRD: ABNORMAL ML/MIN/{1.73_M2}
GFR SERPL CREATININE-BSD FRML MDRD: ABNORMAL ML/MIN/{1.73_M2}
GLUCOSE BLD-MCNC: 94 MG/DL (ref 70–99)
HCT VFR BLD CALC: 36.7 % (ref 36–46)
HEMOGLOBIN: 12.2 G/DL (ref 12–16)
MCH RBC QN AUTO: 28.9 PG (ref 26–34)
MCHC RBC AUTO-ENTMCNC: 33.2 G/DL (ref 31–37)
MCV RBC AUTO: 87.2 FL (ref 80–100)
NRBC AUTOMATED: NORMAL PER 100 WBC
PDW BLD-RTO: 12.9 % (ref 11.5–14.9)
PLATELET # BLD: 236 K/UL (ref 150–450)
PMV BLD AUTO: 7.8 FL (ref 6–12)
POTASSIUM SERPL-SCNC: 4.7 MMOL/L (ref 3.7–5.3)
RBC # BLD: 4.21 M/UL (ref 4–5.2)
SODIUM BLD-SCNC: 140 MMOL/L (ref 135–144)
WBC # BLD: 7.5 K/UL (ref 3.5–11)

## 2018-12-03 PROCEDURE — 6370000000 HC RX 637 (ALT 250 FOR IP): Performed by: INTERNAL MEDICINE

## 2018-12-03 PROCEDURE — 80048 BASIC METABOLIC PNL TOTAL CA: CPT

## 2018-12-03 PROCEDURE — 36415 COLL VENOUS BLD VENIPUNCTURE: CPT

## 2018-12-03 PROCEDURE — 2580000003 HC RX 258: Performed by: NURSE PRACTITIONER

## 2018-12-03 PROCEDURE — 6370000000 HC RX 637 (ALT 250 FOR IP): Performed by: NURSE PRACTITIONER

## 2018-12-03 PROCEDURE — 85027 COMPLETE CBC AUTOMATED: CPT

## 2018-12-03 PROCEDURE — 99238 HOSP IP/OBS DSCHRG MGMT 30/<: CPT | Performed by: INTERNAL MEDICINE

## 2018-12-03 PROCEDURE — 2500000003 HC RX 250 WO HCPCS: Performed by: INTERNAL MEDICINE

## 2018-12-03 PROCEDURE — 2580000003 HC RX 258: Performed by: INTERNAL MEDICINE

## 2018-12-03 PROCEDURE — 6360000002 HC RX W HCPCS: Performed by: INTERNAL MEDICINE

## 2018-12-03 PROCEDURE — 6360000002 HC RX W HCPCS: Performed by: NURSE PRACTITIONER

## 2018-12-03 RX ORDER — ONDANSETRON 4 MG/1
4 TABLET, ORALLY DISINTEGRATING ORAL EVERY 8 HOURS PRN
Status: DISCONTINUED | OUTPATIENT
Start: 2018-12-03 | End: 2018-12-03 | Stop reason: HOSPADM

## 2018-12-03 RX ORDER — METHYLPREDNISOLONE SODIUM SUCCINATE 125 MG/2ML
125 INJECTION, POWDER, LYOPHILIZED, FOR SOLUTION INTRAMUSCULAR; INTRAVENOUS ONCE
Status: COMPLETED | OUTPATIENT
Start: 2018-12-03 | End: 2018-12-03

## 2018-12-03 RX ORDER — CEPHALEXIN 250 MG/1
250 CAPSULE ORAL 3 TIMES DAILY
Qty: 9 CAPSULE | Refills: 0 | Status: SHIPPED | OUTPATIENT
Start: 2018-12-03 | End: 2018-12-10 | Stop reason: ALTCHOICE

## 2018-12-03 RX ADMIN — DEXTROAMPHETAMINE SACCHARATE, AMPHETAMINE ASPARTATE MONOHYDRATE, DEXTROAMPHETAMINE SULFATE AND AMPHETAMINE SULFATE 20 MG: 5; 5; 5; 5 CAPSULE, EXTENDED RELEASE ORAL at 09:34

## 2018-12-03 RX ADMIN — ONDANSETRON 4 MG: 4 TABLET, ORALLY DISINTEGRATING ORAL at 12:29

## 2018-12-03 RX ADMIN — MORPHINE SULFATE 4 MG: 4 INJECTION INTRAVENOUS at 03:59

## 2018-12-03 RX ADMIN — SODIUM CHLORIDE: 9 INJECTION, SOLUTION INTRAVENOUS at 04:02

## 2018-12-03 RX ADMIN — BUPROPION HYDROCHLORIDE 300 MG: 300 TABLET ORAL at 09:34

## 2018-12-03 RX ADMIN — ACETAMINOPHEN 650 MG: 325 TABLET, FILM COATED ORAL at 09:34

## 2018-12-03 RX ADMIN — METHYLPREDNISOLONE SODIUM SUCCINATE 125 MG: 125 INJECTION, POWDER, FOR SOLUTION INTRAMUSCULAR; INTRAVENOUS at 15:01

## 2018-12-03 RX ADMIN — CHOLECALCIFEROL TAB 125 MCG (5000 UNIT) 5000 UNITS: 125 TAB at 06:00

## 2018-12-03 RX ADMIN — CEFTRIAXONE SODIUM 1 G: 1 INJECTION, POWDER, FOR SOLUTION INTRAMUSCULAR; INTRAVENOUS at 05:21

## 2018-12-03 RX ADMIN — METRONIDAZOLE 500 MG: 500 INJECTION, SOLUTION INTRAVENOUS at 05:20

## 2018-12-03 RX ADMIN — PROMETHAZINE HYDROCHLORIDE 12.5 MG: 25 INJECTION INTRAMUSCULAR; INTRAVENOUS at 03:58

## 2018-12-03 RX ADMIN — NORETHINDRONE ACETATE AND ETHINYL ESTRADIOL 1 TABLET: KIT at 09:34

## 2018-12-03 ASSESSMENT — PAIN DESCRIPTION - LOCATION: LOCATION: ABDOMEN

## 2018-12-03 ASSESSMENT — PAIN DESCRIPTION - PAIN TYPE: TYPE: ACUTE PAIN

## 2018-12-03 ASSESSMENT — PAIN SCALES - GENERAL
PAINLEVEL_OUTOF10: 10
PAINLEVEL_OUTOF10: 7
PAINLEVEL_OUTOF10: 9
PAINLEVEL_OUTOF10: 5

## 2018-12-03 ASSESSMENT — PAIN DESCRIPTION - DESCRIPTORS: DESCRIPTORS: DISCOMFORT;DULL;SHARP

## 2018-12-03 ASSESSMENT — PAIN DESCRIPTION - ORIENTATION: ORIENTATION: LOWER;MID

## 2018-12-03 ASSESSMENT — PAIN DESCRIPTION - FREQUENCY: FREQUENCY: CONTINUOUS

## 2018-12-03 ASSESSMENT — PAIN DESCRIPTION - ONSET: ONSET: ON-GOING

## 2018-12-03 NOTE — PROGRESS NOTES
Patient c/o pain and burning in her arm. Patient states she received IV phenergan last night and it has been burning since. Patient removed her IV. Warm compresses applied. Patient able to move arm. No swelling or redness noted. Message sent to Dr. Dennie Rock to notify him.

## 2018-12-03 NOTE — CARE COORDINATION
ONGOING DISCHARGE PLAN:    Spoke with patient regarding discharge plan and patient confirms that plan is still to go home with no needs. On IV Rocephin, IV Pepcid, IV solu medrol, IV Flagyl    Probable discharge today or tomorrow    Will continue to follow for additional discharge needs. Follow up appt made  12/11/18  At 11:00a.m. With Dr Layne Gonzalez.     Electronically signed by Jayme Parsons RN on 12/3/2018 at 4:26 PM

## 2018-12-04 ENCOUNTER — TELEPHONE (OUTPATIENT)
Dept: FAMILY MEDICINE CLINIC | Age: 23
End: 2018-12-04

## 2018-12-04 ENCOUNTER — CARE COORDINATION (OUTPATIENT)
Dept: CASE MANAGEMENT | Age: 23
End: 2018-12-04

## 2018-12-04 DIAGNOSIS — R11.0 NAUSEA: Primary | ICD-10-CM

## 2018-12-04 DIAGNOSIS — N12 PYELONEPHRITIS: Primary | ICD-10-CM

## 2018-12-04 PROCEDURE — 1111F DSCHRG MED/CURRENT MED MERGE: CPT

## 2018-12-04 RX ORDER — ONDANSETRON 4 MG/1
4 TABLET, FILM COATED ORAL EVERY 6 HOURS PRN
Qty: 24 TABLET | Refills: 0 | Status: SHIPPED | OUTPATIENT
Start: 2018-12-04 | End: 2018-12-10

## 2018-12-04 NOTE — TELEPHONE ENCOUNTER
Patient was discharged from [facility] (Pyelonephritis) last night and has appt for 12/6   patient wants to know if she can get anything for nausea , she was told that she will be discharged with some but nothing was sent to her pharmacy   Please Advice

## 2018-12-04 NOTE — CARE COORDINATION
[facility] Transitions Initial Follow Up Call    Call within 2 business days of discharge: Yes    Patient: Sarthak Grayson Patient : 1995   MRN: [id number]  Reason for Admission: There are no discharge diagnoses documented for the most recent discharge. Discharge Date: 12/3/18 RARS: Readmission Risk Score: 7     Spoke with: Genet    Facility: [facility]    Non-face-to-face services provided:  Obtained and reviewed discharge summary and/or continuity of care documents  Communication with home health agencies or other community services the patient is currently using-contacted Gauri Patiño in pcp's office, requested something be ordered for c/o nausea  Assessment and support for treatment adherence and medication management-reviewed discharge instructions and medications, 1111F order completed//DWIGHT Writer spoke to patient, patient stated she was still having some nausea and was discharged home without anything for nausea, writer contacted Gauri Patiño in pcp's office, he spoke with pcp and zofran was ordered and called into patient's pharmacy. Patient notified that medications was ordered.  Reviewed discharge medications and instructions, 1111F order completed, TCM visit scheduled prior to discharge, will follow//DWIGHT    Care Transitions 24 Hour Call    Do you have any ongoing symptoms?:  Yes  Patient-reported symptoms:  Nausea  Interventions for patient-reported symptoms:  (Comment: writer contacted Gauri Patiño, requested something for nausea)  Do you have a copy of your discharge instructions?:  Yes  Do you have all of your prescriptions and are they filled?:  Yes  Have you been contacted by a [employer] [address]?:  No  Have you scheduled your follow up appointment?:  Yes  How are you going to get to your appointment?:  Car - family or friend to transport  Were you discharged with any Home Care or Post Acute Services:  No  Do you feel like you have everything you need to keep you well at home?:  No  Care Transitions

## 2018-12-05 ENCOUNTER — OFFICE VISIT (OUTPATIENT)
Dept: FAMILY MEDICINE CLINIC | Age: 23
End: 2018-12-05
Payer: COMMERCIAL

## 2018-12-05 VITALS
HEART RATE: 112 BPM | BODY MASS INDEX: 24.62 KG/M2 | DIASTOLIC BLOOD PRESSURE: 78 MMHG | HEIGHT: 66 IN | SYSTOLIC BLOOD PRESSURE: 120 MMHG | OXYGEN SATURATION: 100 % | WEIGHT: 153.2 LBS

## 2018-12-05 DIAGNOSIS — M79.89 SWELLING OF UPPER ARM: ICD-10-CM

## 2018-12-05 DIAGNOSIS — M79.602 PAIN OF LEFT UPPER EXTREMITY: ICD-10-CM

## 2018-12-05 DIAGNOSIS — N12 PYELONEPHRITIS: Primary | ICD-10-CM

## 2018-12-05 DIAGNOSIS — L03.114 CELLULITIS OF LEFT UPPER EXTREMITY: ICD-10-CM

## 2018-12-05 DIAGNOSIS — N20.0 NEPHROLITHIASIS: ICD-10-CM

## 2018-12-05 PROCEDURE — 99496 TRANSJ CARE MGMT HIGH F2F 7D: CPT | Performed by: FAMILY MEDICINE

## 2018-12-05 PROCEDURE — 1111F DSCHRG MED/CURRENT MED MERGE: CPT | Performed by: FAMILY MEDICINE

## 2018-12-05 RX ORDER — SULFAMETHOXAZOLE AND TRIMETHOPRIM 800; 160 MG/1; MG/1
1 TABLET ORAL 2 TIMES DAILY
Qty: 14 TABLET | Refills: 0 | Status: SHIPPED | OUTPATIENT
Start: 2018-12-05 | End: 2018-12-12 | Stop reason: ALTCHOICE

## 2018-12-05 RX ORDER — ARIPIPRAZOLE 5 MG/1
2.5 TABLET ORAL DAILY
COMMUNITY
End: 2020-08-14

## 2018-12-05 ASSESSMENT — ENCOUNTER SYMPTOMS
CHEST TIGHTNESS: 0
EYE REDNESS: 0
FACIAL SWELLING: 0
BACK PAIN: 0
RHINORRHEA: 0
BLOOD IN STOOL: 0
CONSTIPATION: 0
PHOTOPHOBIA: 0
SINUS PAIN: 0
WHEEZING: 0
ABDOMINAL DISTENTION: 0
SINUS PRESSURE: 0
DIARRHEA: 0
SHORTNESS OF BREATH: 0

## 2018-12-05 NOTE — PROGRESS NOTES
daily           Medications patient taking as of now reconciled against medications ordered at time of hospital discharge: Yes    Chief Complaint   Patient presents with   [address] from [address] is here for hospital follow-up was discharged on Monday for pyelonephritis. Patient reports she was at work and started having nausea and vomiting and was continuously vomiting after that started having pain in her back and lower abdominal.  She went to ER had blood work and urine , which showed elevated WBC count and UTI. She had CT abdomen done that showed small stone, with no obstructive uropathy. Patient was treated with IV antibiotics and was discharged on Keflex. Reports her symptoms has mildly improved denies any pain. Patient reports swelling in her left upper arm started on Monday morning after she received Phenergan injection and also she had ID placed at the same site. Patient reports she is not able to bend her arm and her upper arm is hard, warm and in pain. She denies any fever or chills. She has made an appointment with muscular surgeon on Monday. Inpatient course: Discharge summary reviewed- see chart. Interval history/Current status: stable     Review of Systems   Constitutional: Positive for activity change. Negative for appetite change, diaphoresis and fever. HENT: Negative for congestion, facial swelling, postnasal drip, rhinorrhea, sinus pain and sinus pressure. Eyes: Negative for photophobia, redness and visual disturbance. Respiratory: Negative for chest tightness, shortness of breath and wheezing. Cardiovascular: Negative for chest pain, palpitations and leg swelling. Gastrointestinal: Negative for abdominal distention, blood in stool, constipation and diarrhea. Endocrine: Negative for polydipsia, polyphagia and polyuria. Genitourinary: Negative for dysuria, flank pain, frequency, hematuria and urgency.    Musculoskeletal: Negative for arthralgias, back pain and neck pain. Neurological: Negative for dizziness, weakness, numbness and headaches. Psychiatric/Behavioral: Negative for agitation, decreased concentration, dysphoric mood, hallucinations and sleep disturbance. The patient is not nervous/anxious and is not hyperactive. Vitals:    12/05/18 1230   BP: 120/78   Site: Left Upper Arm   Position: Sitting   Pulse: 112   SpO2: 100%   Weight: 153 lb 3.2 oz (69.5 kg)   Height: 5' 6\" (1.676 m)     Body mass index is 24.73 kg/m². Wt Readings from Last 3 Encounters:   12/05/18 153 lb 3.2 oz (69.5 kg)   12/02/18 153 lb (69.4 kg)   11/19/18 148 lb 3.2 oz (67.2 kg)     BP Readings from Last 3 Encounters:   12/05/18 120/78   12/03/18 112/63   11/19/18 122/81       Physical Exam   Constitutional: She appears well-developed and well-nourished. She appears distressed. HENT:   Head: Normocephalic. Eyes: Pupils are equal, round, and reactive to light. Conjunctivae are normal.   Neck: Normal range of motion. Neck supple. No thyromegaly present. Cardiovascular: Normal rate, regular rhythm and normal heart sounds. No murmur heard. Pulmonary/Chest: Effort normal and breath sounds normal. No respiratory distress. Abdominal: Soft. Musculoskeletal:        Right upper arm: She exhibits tenderness, bony tenderness, swelling, edema and deformity. Arms:  It is hard swelling seen on anterior aspect of arm, erythematous and warm and tender on touch   Skin: She is not diaphoretic. Vitals reviewed. Assessment/Plan:  1. Pyelonephritis  -Improved continue antibiotics. - MD DISCHARGE MEDS RECONCILED W/ CURRENT OUTPATIENT MED LIST    2. Swelling of upper arm  -Like a cellulitis, or thrombophlebitis, take ibuprofen ultrasound. Close follow-up in one week  - MD DISCHARGE MEDS RECONCILED W/ CURRENT OUTPATIENT MED LIST  - Ultrasound doppler venous arm left; Future  - US EXTREMITY LEFT NON VASC LIMITED;  Future  - sulfamethoxazole-trimethoprim (BACTRIM DS) 800-160 MG per tablet; Take 1 tablet by mouth 2 times daily for 7 days  Dispense: 14 tablet; Refill: 0    3. Pain of left upper extremity  ) In 1 week  - Ultrasound doppler venous arm left; Future  - US EXTREMITY LEFT NON VASC LIMITED; Future  - sulfamethoxazole-trimethoprim (BACTRIM DS) 800-160 MG per tablet; Take 1 tablet by mouth 2 times daily for 7 days  Dispense: 14 tablet; Refill: 0    4. Cellulitis of left upper extremity  COld compresses- sulfamethoxazole-trimethoprim (BACTRIM DS) 800-160 MG per tablet; Take 1 tablet by mouth 2 times daily for 7 days  Dispense: 14 tablet; Refill: 0    5.  Nephrolithiasis  -Drink plenty of fluids        Medical Decision Making: low complexity

## 2018-12-06 ENCOUNTER — CARE COORDINATION (OUTPATIENT)
Dept: CASE MANAGEMENT | Age: 23
End: 2018-12-06

## 2018-12-06 ENCOUNTER — TELEPHONE (OUTPATIENT)
Dept: FAMILY MEDICINE CLINIC | Age: 23
End: 2018-12-06

## 2018-12-06 ENCOUNTER — OFFICE VISIT (OUTPATIENT)
Dept: FAMILY MEDICINE CLINIC | Age: 23
End: 2018-12-06
Payer: COMMERCIAL

## 2018-12-06 ENCOUNTER — NURSE TRIAGE (OUTPATIENT)
Dept: OTHER | Facility: CLINIC | Age: 23
End: 2018-12-06

## 2018-12-06 VITALS
DIASTOLIC BLOOD PRESSURE: 68 MMHG | WEIGHT: 150.6 LBS | SYSTOLIC BLOOD PRESSURE: 124 MMHG | HEART RATE: 122 BPM | OXYGEN SATURATION: 100 % | BODY MASS INDEX: 24.2 KG/M2 | HEIGHT: 66 IN

## 2018-12-06 DIAGNOSIS — R19.5 STOOL DISCOLORATION: Primary | ICD-10-CM

## 2018-12-06 DIAGNOSIS — R10.9 ABDOMINAL CRAMPING: ICD-10-CM

## 2018-12-06 PROCEDURE — 99213 OFFICE O/P EST LOW 20 MIN: CPT | Performed by: FAMILY MEDICINE

## 2018-12-06 ASSESSMENT — ENCOUNTER SYMPTOMS
BLOOD IN STOOL: 0
ABDOMINAL DISTENTION: 0
DIARRHEA: 0
ABDOMINAL PAIN: 1
NAUSEA: 1
WHEEZING: 0
CONSTIPATION: 0
PHOTOPHOBIA: 0
SHORTNESS OF BREATH: 0
COUGH: 0

## 2018-12-06 NOTE — PROGRESS NOTES
indicated. Final    Status 11/29/2018 FINAL 11/29/2018   Final    WBC 12/02/2018 7.3  3.5 - 11.0 k/uL Final    RBC 12/02/2018 4.20  4.0 - 5.2 m/uL Final    Hemoglobin 12/02/2018 12.0  12.0 - 16.0 g/dL Final    Hematocrit 12/02/2018 36.7  36 - 46 % Final    MCV 12/02/2018 87.5  80 - 100 fL Final    MCH 12/02/2018 28.7  26 - 34 pg Final    MCHC 12/02/2018 32.8  31 - 37 g/dL Final    RDW 12/02/2018 12.9  11.5 - 14.9 % Final    Platelets 25/48/4915 249  150 - 450 k/uL Final    MPV 12/02/2018 7.9  6.0 - 12.0 fL Final    NRBC Automated 12/02/2018 NOT REPORTED  per 100 WBC Final    Glucose 12/02/2018 94  70 - 99 mg/dL Final    BUN 12/02/2018 8  6 - 20 mg/dL Final    CREATININE 12/02/2018 0.76  0.50 - 0.90 mg/dL Final    Bun/Cre Ratio 12/02/2018 NOT REPORTED  9 - 20 Final    Calcium 12/02/2018 8.0* 8.6 - 10.4 mg/dL Final    Sodium 12/02/2018 139  135 - 144 mmol/L Final    Potassium 12/02/2018 4.1  3.7 - 5.3 mmol/L Final    Chloride 12/02/2018 106  98 - 107 mmol/L Final    CO2 12/02/2018 23  20 - 31 mmol/L Final    Anion Gap 12/02/2018 10  9 - 17 mmol/L Final    GFR Non- 12/02/2018 >60  >60 mL/min Final    GFR  12/02/2018 >60  >60 mL/min Final    GFR Comment 12/02/2018        Final    Comment: Average GFR for 20-28 years old:   80 mL/min/1.73sq m  Chronic Kidney Disease:   <60 mL/min/1.73sq m  Kidney failure:   <15 mL/min/1.73sq m              eGFR calculated using average adult body mass.  Additional eGFR calculator   available at:        [email or web address]            GFR Staging 12/02/2018 NOT REPORTED   Final    WBC 12/03/2018 7.5  3.5 - 11.0 k/uL Final    RBC 12/03/2018 4.21  4.0 - 5.2 m/uL Final    Hemoglobin 12/03/2018 12.2  12.0 - 16.0 g/dL Final    Hematocrit 12/03/2018 36.7  36 - 46 % Final    MCV 12/03/2018 87.2  80 - 100 fL Final    MCH 12/03/2018 28.9  26 - 34 pg Final    MCHC 12/03/2018 33.2  31 - 37 g/dL Final    RDW 12/03/2018 12.9  11.5 - 14.9 % Final    Platelets 02/86/8401 236  150 - 450 k/uL Final    MPV 12/03/2018 7.8  6.0 - 12.0 fL Final    NRBC Automated 12/03/2018 NOT REPORTED  per 100 WBC Final    Glucose 12/03/2018 94  70 - 99 mg/dL Final    BUN 12/03/2018 8  6 - 20 mg/dL Final    CREATININE 12/03/2018 0.87  0.50 - 0.90 mg/dL Final    Bun/Cre Ratio 12/03/2018 NOT REPORTED  9 - 20 Final    Calcium 12/03/2018 8.3* 8.6 - 10.4 mg/dL Final    Sodium 12/03/2018 140  135 - 144 mmol/L Final    Potassium 12/03/2018 4.7  3.7 - 5.3 mmol/L Final    Chloride 12/03/2018 109* 98 - 107 mmol/L Final    CO2 12/03/2018 25  20 - 31 mmol/L Final    Anion Gap 12/03/2018 6* 9 - 17 mmol/L Final    GFR Non- 12/03/2018 >60  >60 mL/min Final    GFR  12/03/2018 >60  >60 mL/min Final    GFR Comment 12/03/2018        Final    Comment: Average GFR for 20-28 years old:   80 mL/min/1.73sq m  Chronic Kidney Disease:   <60 mL/min/1.73sq m  Kidney failure:   <15 mL/min/1.73sq m              eGFR calculated using average adult body mass.  Additional eGFR calculator   available at:        [email or web address]            GFR Staging 12/03/2018 NOT REPORTED   Final         Most recent labs reviewed:     Lab Results   Component Value Date    WBC 7.5 12/03/2018    HGB 12.2 12/03/2018    HCT 36.7 12/03/2018    MCV 87.2 12/03/2018     12/03/2018       @BRIEFLAB(NA,K,CL,CO2,BUN,CREATININE,GLUCOSE,CALCIUM)@     Lab Results   Component Value Date    ALT 9 11/29/2018    AST 11 11/29/2018    ALKPHOS 52 11/29/2018    BILITOT 0.75 11/29/2018       No results found for: TSHFT4, TSH    No results found for: CHOL  No results found for: TRIG  No results found for: HDL  No results found for: LDLCALC, LDLCHOLESTEROL  No results found for: LABVLDL, VLDL  No results found for: CHOLHDLRATIO    No results found for: LABA1C    No results found for: TPABGFYW76    No results found for:

## 2018-12-07 ENCOUNTER — HOSPITAL ENCOUNTER (OUTPATIENT)
Dept: VASCULAR LAB | Age: 23
Discharge: HOME | End: 2018-12-07
Payer: COMMERCIAL

## 2018-12-07 ENCOUNTER — HOSPITAL ENCOUNTER (OUTPATIENT)
Dept: ULTRASOUND IMAGING | Age: 23
Discharge: HOME | End: 2018-12-09
Payer: COMMERCIAL

## 2018-12-07 ENCOUNTER — TELEPHONE (OUTPATIENT)
Dept: FAMILY MEDICINE CLINIC | Age: 23
End: 2018-12-07

## 2018-12-07 DIAGNOSIS — I80.8 SUPERFICIAL THROMBOPHLEBITIS OF LEFT UPPER EXTREMITY: Primary | ICD-10-CM

## 2018-12-07 DIAGNOSIS — M79.89 SWELLING OF UPPER ARM: ICD-10-CM

## 2018-12-07 DIAGNOSIS — M79.602 PAIN OF LEFT UPPER EXTREMITY: ICD-10-CM

## 2018-12-07 PROCEDURE — 93971 EXTREMITY STUDY: CPT

## 2018-12-07 PROCEDURE — 76882 US LMTD JT/FCL EVL NVASC XTR: CPT

## 2018-12-10 ENCOUNTER — CARE COORDINATION (OUTPATIENT)
Dept: CASE MANAGEMENT | Age: 23
End: 2018-12-10

## 2018-12-10 ENCOUNTER — INITIAL CONSULT (OUTPATIENT)
Dept: VASCULAR SURGERY | Age: 23
End: 2018-12-10
Payer: COMMERCIAL

## 2018-12-10 VITALS
BODY MASS INDEX: 24.2 KG/M2 | SYSTOLIC BLOOD PRESSURE: 118 MMHG | DIASTOLIC BLOOD PRESSURE: 68 MMHG | OXYGEN SATURATION: 98 % | HEART RATE: 108 BPM | HEIGHT: 66 IN | WEIGHT: 150.57 LBS | RESPIRATION RATE: 18 BRPM

## 2018-12-10 DIAGNOSIS — I80.8 THROMBOPHLEBITIS ARM: Primary | ICD-10-CM

## 2018-12-10 PROCEDURE — 99203 OFFICE O/P NEW LOW 30 MIN: CPT | Performed by: SURGERY

## 2018-12-11 ASSESSMENT — ENCOUNTER SYMPTOMS
ALLERGIC/IMMUNOLOGIC NEGATIVE: 1
DIARRHEA: 1
EYES NEGATIVE: 1
ABDOMINAL PAIN: 1
SHORTNESS OF BREATH: 0

## 2018-12-11 NOTE — PROGRESS NOTES
(ADDERALL XR) 20 MG extended release capsule Take 20 mg by mouth 2 times daily. Channing [place] EPINEPHrine (EPIPEN 2-FELIPE) 0.3 MG/0.3ML SOAJ injection Inject 0.3 mg into the muscle as needed Use as directed for allergic reaction      buPROPion (WELLBUTRIN XL) 300 MG extended release tablet Take 300 mg by mouth every morning      EPINEPHrine (EPIPEN 2-FELIPE) 0.3 MG/0.3ML SOAJ injection Inject 0.3 mLs into the skin as needed (during allergies) Use as directed for allergic reaction 0.3 mL 3    Prenatal Vit-Fe Fumarate-FA (PNV PRENATAL PLUS MULTIVITAMIN) 27-1 MG TABS       GILDESS FE 1.5/30 1.5-30 MG-MCG tablet Take 1 tablet by mouth daily        No current facility-administered medications for this visit. Social History:     Tobacco:    reports that she has never smoked. She has never used smokeless tobacco.  Alcohol:      reports that she does not drink alcohol. Drug Use:  reports that she does not use drugs. 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 HENT: Negative. Eyes: Negative. Respiratory: Negative for shortness of breath. Cardiovascular: Negative for chest pain. Gastrointestinal: Positive for abdominal pain and diarrhea. Endocrine: Negative. Genitourinary: Negative. Musculoskeletal: Negative. Skin: Negative for wound. Allergic/Immunologic: Negative. Neurological: Positive for numbness. Negative for weakness. Hematological: Negative. Psychiatric/Behavioral: Negative. Physical Exam:     Vitals:  /68 (Site: Right Upper Arm, Position: Sitting, Cuff Size: Medium Adult)   Pulse 108   Resp 18   Ht 5' 5.98\" (1.676 m)   Wt 150 lb 9.2 oz (68.3 kg)   SpO2 98%   BMI 24.32 kg/m²     Physical Exam   Constitutional: She is oriented to person, place, and time. She appears well-developed and well-nourished.    Eyes: Conjunctivae and EOM are normal.   Cardiovascular:   Pulses:

## 2018-12-12 ENCOUNTER — OFFICE VISIT (OUTPATIENT)
Dept: FAMILY MEDICINE CLINIC | Age: 23
End: 2018-12-12
Payer: COMMERCIAL

## 2018-12-12 VITALS
HEART RATE: 109 BPM | WEIGHT: 145.8 LBS | OXYGEN SATURATION: 100 % | DIASTOLIC BLOOD PRESSURE: 80 MMHG | SYSTOLIC BLOOD PRESSURE: 125 MMHG | HEIGHT: 66 IN | BODY MASS INDEX: 23.43 KG/M2

## 2018-12-12 DIAGNOSIS — L03.114 CELLULITIS OF LEFT UPPER EXTREMITY: ICD-10-CM

## 2018-12-12 DIAGNOSIS — I80.8 SUPERFICIAL THROMBOPHLEBITIS OF LEFT UPPER EXTREMITY: Primary | ICD-10-CM

## 2018-12-12 DIAGNOSIS — M79.89 SWELLING OF UPPER ARM: ICD-10-CM

## 2018-12-12 PROBLEM — N12 PYELONEPHRITIS: Status: RESOLVED | Noted: 2018-11-29 | Resolved: 2018-12-12

## 2018-12-12 PROBLEM — L50.9 URTICARIA: Status: RESOLVED | Noted: 2018-05-02 | Resolved: 2018-12-12

## 2018-12-12 PROBLEM — R10.9 ABDOMINAL CRAMPING: Status: RESOLVED | Noted: 2018-12-06 | Resolved: 2018-12-12

## 2018-12-12 PROCEDURE — 99214 OFFICE O/P EST MOD 30 MIN: CPT | Performed by: FAMILY MEDICINE

## 2018-12-12 RX ORDER — ACETAMINOPHEN AND CODEINE PHOSPHATE 300; 30 MG/1; MG/1
1 TABLET ORAL EVERY 8 HOURS PRN
Qty: 9 TABLET | Refills: 0 | Status: SHIPPED | OUTPATIENT
Start: 2018-12-12 | End: 2018-12-15

## 2018-12-12 ASSESSMENT — ENCOUNTER SYMPTOMS
CHEST TIGHTNESS: 0
BLOOD IN STOOL: 0
CONSTIPATION: 0
PHOTOPHOBIA: 0
WHEEZING: 0
SHORTNESS OF BREATH: 0
DIARRHEA: 0
BACK PAIN: 0

## 2018-12-12 NOTE — PROGRESS NOTES
Chief Complaint   Patient presents with    Other     1 week follow up         Joshua Coleman  here today for follow up on chronic medical problems, go over labs and/or diagnostic studies, and medication refills. Other (1 week follow up)      HPI :Patient is here for follow-up for left upper x-ray swelling, has ultrasound Doppler done which showed thrombophlebitis of cephalic vein, she was treated symptomatically and was also started on xeralto. She has seen vascular surgeon . Carly Boyer. Patient's symptoms has improved, swelling has gone down redness has improved. She also finished antibiotics. She is complaining of pain only when she lifts her arm and doing her work. 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  Patient wants to take a few days off and is asking something stronger for pain. She is not able to do laundry. Patient brought in her FMLA papers with her. /80 (Site: Left Upper Arm, Position: Sitting)   Pulse 109   Ht 5' 6\" (1.676 m)   Wt 145 lb 12.8 oz (66.1 kg)   SpO2 100%   BMI 23.53 kg/m²   Body mass index is 23.53 kg/m². Wt Readings from Last 3 Encounters:   12/12/18 145 lb 12.8 oz (66.1 kg)   12/10/18 150 lb 9.2 oz (68.3 kg)   12/06/18 150 lb 9.6 oz (68.3 kg)        []Negative depression screening. PHQ Scores 5/2/2018 9/14/2016   PHQ2 Score 0 0   PHQ9 Score 0 0      []1-4 = Minimal depression   []5-9 = Milddepression   []10-14 = Moderate depression   []15-19 = Moderately severe depression   []20-27 = Severe depression    Discussed testing with the patient and all questions fully answered.     Admission on 11/29/2018, Discharged on 12/03/2018   Component Date Value Ref Range Status    WBC 11/29/2018 19.5* 3.5 - 11.0 k/uL Final    RBC 11/29/2018 4.96  4.0 - 5.2 m/uL Final    Hemoglobin 11/29/2018 14.5  12.0 - 16.0 g/dL Final    Hematocrit 11/29/2018 43.5  36 - 46 % Final    MCV 11/29/2018 87.7  80 - 100 fL Final    MCH 11/29/2018 29.3  26 - 34 pg Final    MCHC 11/29/2018 33.4  31 - 37 g/dL Final    RDW 11/29/2018 12.9  11.5 - 14.9 % Final    Platelets 07/86/7561 276  150 - 450 k/uL Final    MPV 11/29/2018 8.4  6.0 - 12.0 fL Final    NRBC Automated 11/29/2018 NOT REPORTED  per 100 WBC Final    Glucose 11/29/2018 111* 70 - 99 mg/dL Final    BUN 11/29/2018 15  6 - 20 mg/dL Final    CREATININE 11/29/2018 0.78  0.50 - 0.90 mg/dL Final    Bun/Cre Ratio 11/29/2018 NOT REPORTED  9 - 20 Final    Calcium 11/29/2018 8.8  8.6 - 10.4 mg/dL Final    Sodium 11/29/2018 141  135 - 144 mmol/L Final    Potassium 11/29/2018 4.3  3.7 - 5.3 mmol/L Final    Chloride 11/29/2018 105  98 - 107 mmol/L Final    CO2 11/29/2018 23  20 - 31 mmol/L Final    Anion Gap 11/29/2018 13  9 - 17 mmol/L Final    Alkaline Phosphatase 11/29/2018 52  35 - 104 U/L Final    ALT 11/29/2018 9  5 - 33 U/L Final    AST 11/29/2018 11  <32 U/L Final    Total Bilirubin 11/29/2018 0.75  0.3 - 1.2 mg/dL Final    Total Protein 11/29/2018 7.5  6.4 - 8.3 g/dL Final    Alb 11/29/2018 4.4  3.5 - 5.2 g/dL Final    Albumin/Globulin Ratio 11/29/2018 NOT REPORTED  1.0 - 2.5 Final    GFR Non- 11/29/2018 >60  >60 mL/min Final    GFR  11/29/2018 >60  >60 mL/min Final    GFR Comment 11/29/2018        Final    Comment: Average GFR for 20-28 years old:   80 mL/min/1.73sq m  Chronic Kidney Disease:   <60 mL/min/1.73sq m  Kidney failure:   <15 mL/min/1.73sq m              eGFR calculated using average adult body mass. Additional eGFR calculator   available at:        [email or web address]            GFR Staging 11/29/2018 NOT REPORTED   Final    Lipase 11/29/2018 21  13 - 60 U/L Final    Color, UA 11/29/2018 DARK YELLOW* YEL Final    Turbidity UA 11/29/2018 CLEAR  CLEAR Final    Glucose, Ur 11/29/2018 NEGATIVE  NEG Final    Bilirubin Urine 11/29/2018 Presumptive positive. Unable to confirm due to unavailability of reagent. * NEG Final    Ketones, Urine 11/29/2018 MOD* NEG Final    Specific [place], UA 11/29/2018 1.028  1.000 - 1.030 Final    Urine Hgb 11/29/2018 TRACE* NEG Final    pH, UA 11/29/2018 5.5  5.0 - 8.0 Final    Protein, UA 11/29/2018 NEGATIVE  NEG Final    Urobilinogen, Urine 11/29/2018 Normal  NORM Final    Nitrite, Urine 11/29/2018 NEGATIVE  NEG Final    Leukocyte Esterase, Urine 11/29/2018 SMALL* NEG Final    Urinalysis Comments 11/29/2018 NOT REPORTED   Final    HCG(Urine) Pregnancy Test 11/29/2018 NEGATIVE  NEG Final    Comment: Specimens with hCG levels near the threshold of the test (25 mIU/mL) may give a   negative or indeterminate result. In such cases, another test should be   performed with a new specimen in 48-72 hours. If early pregnancy is suspected   clinically in this setting, correlation with quantitative serum b-hCG level is   suggested.  - 11/29/2018        Final    WBC, UA 11/29/2018 10 TO 20  /HPF Final    RBC, UA 11/29/2018 0 TO 2  /HPF Final    Casts UA 11/29/2018 NOT REPORTED  /LPF Final    Crystals UA 11/29/2018 NOT REPORTED  NONE /HPF Final    Epithelial Cells UA 11/29/2018 10 TO 20  /HPF Final    Renal Epithelial, Urine 11/29/2018 NOT REPORTED  0 /HPF Final    Bacteria, UA 11/29/2018 FEW* NONE Final    Mucus, UA 11/29/2018 3+* NONE Final    Trichomonas, UA 11/29/2018 NOT REPORTED  NONE Final    Amorphous, UA 11/29/2018 NOT REPORTED  NONE Final    Other Observations UA 11/29/2018 NOT REPORTED  NREQ Final    Yeast, UA 11/29/2018 NOT REPORTED  NONE Final    Specimen Description 11/29/2018 . NASOPHARYNGEAL SWAB   Final    Special Requests 11/29/2018 NOT REPORTED   Final    Direct Exam 11/29/2018 PRESUMPTIVE NEGATIVE for Influenza A + B antigens. Final    Direct Exam 11/29/2018 PCR testing to confirm this result is available upon request.  Specimen will be   Final    Direct Exam 11/29/2018  saved in the laboratory for 7 days.   Please call [phone] if PCR testing is   Final    Direct Exam 11/29/2018  indicated. Final    Status 11/29/2018 FINAL 11/29/2018   Final    WBC 12/02/2018 7.3  3.5 - 11.0 k/uL Final    RBC 12/02/2018 4.20  4.0 - 5.2 m/uL Final    Hemoglobin 12/02/2018 12.0  12.0 - 16.0 g/dL Final    Hematocrit 12/02/2018 36.7  36 - 46 % Final    MCV 12/02/2018 87.5  80 - 100 fL Final    MCH 12/02/2018 28.7  26 - 34 pg Final    MCHC 12/02/2018 32.8  31 - 37 g/dL Final    RDW 12/02/2018 12.9  11.5 - 14.9 % Final    Platelets 34/93/6561 249  150 - 450 k/uL Final    MPV 12/02/2018 7.9  6.0 - 12.0 fL Final    NRBC Automated 12/02/2018 NOT REPORTED  per 100 WBC Final    Glucose 12/02/2018 94  70 - 99 mg/dL Final    BUN 12/02/2018 8  6 - 20 mg/dL Final    CREATININE 12/02/2018 0.76  0.50 - 0.90 mg/dL Final    Bun/Cre Ratio 12/02/2018 NOT REPORTED  9 - 20 Final    Calcium 12/02/2018 8.0* 8.6 - 10.4 mg/dL Final    Sodium 12/02/2018 139  135 - 144 mmol/L Final    Potassium 12/02/2018 4.1  3.7 - 5.3 mmol/L Final    Chloride 12/02/2018 106  98 - 107 mmol/L Final    CO2 12/02/2018 23  20 - 31 mmol/L Final    Anion Gap 12/02/2018 10  9 - 17 mmol/L Final    GFR Non- 12/02/2018 >60  >60 mL/min Final    GFR  12/02/2018 >60  >60 mL/min Final    GFR Comment 12/02/2018        Final    Comment: Average GFR for 20-28 years old:   80 mL/min/1.73sq m  Chronic Kidney Disease:   <60 mL/min/1.73sq m  Kidney failure:   <15 mL/min/1.73sq m              eGFR calculated using average adult body mass.  Additional eGFR calculator   available at:        [email or web address]            GFR Staging 12/02/2018 NOT REPORTED   Final    WBC 12/03/2018 7.5  3.5 - 11.0 k/uL Final    RBC 12/03/2018 4.21  4.0 - 5.2 m/uL Final    Hemoglobin 12/03/2018 12.2  12.0 - 16.0 g/dL Final    Hematocrit 12/03/2018 36.7  36 - 46 % Final    MCV 12/03/2018 87.2  80 - 100 fL Final    MCH 12/03/2018 28.9  26 - 34 pg Final    MCHC 12/03/2018 33.2  31 - 37 g/dL polar    High Cholesterol Father              -rest of complaints with corresponding details per ROS    The patient's past medical, surgical, social, and family history as well as her current medications and allergies were reviewed as documented intoday's encounter. Review of Systems   Constitutional: Positive for activity change. Negative for appetite change, diaphoresis and fatigue. HENT: Negative. Eyes: Negative for photophobia and visual disturbance. Respiratory: Negative for chest tightness, shortness of breath and wheezing. Cardiovascular: Negative for chest pain and palpitations. Gastrointestinal: Negative for blood in stool, constipation and diarrhea. Endocrine: Negative for polyuria. Genitourinary: Negative for difficulty urinating, flank pain, frequency and hematuria. Musculoskeletal: Negative for arthralgias, back pain and gait problem. Left arm pain   Neurological: Negative for weakness and numbness. Psychiatric/Behavioral: Negative for agitation and decreased concentration. The patient is not hyperactive. Physical Exam    PHYSICAL EXAM:   VITALS:   Vitals:    12/12/18 1212   BP: 125/80   Pulse: 109   SpO2: 100%     GENERAL:  Patient is a well-developed, well-nourished female  in no acute distress, alert and oriented x3, appropriate and pleasant conversation. HEAD: Normocephalic, atraumatic. EYES: Pupils equal, round and reactive to light and accommodation, extraocular   movements intact. ENT: Moist mucous membranes. No erythema is noted. NECK: Supple. No masses. No lymphadenopathy. CARDIOVASCULAR: Regular rate and rhythm. PULMONARY: Lungs are clear to auscultation bilaterally. ABDOMEN: Soft, nontender, nondistended. Positive bowel sounds. MUSCULOSKELETAL: Swelling and inflammation has improved from previous exam.   NEUROLOGIC: Cranial nerves II through XII grossly intact. No focal deficits are noted. ASSESSMENT AND PLAN      1. Superficial thrombophlebitis of left upper extremity  -Discussed with patient take Tylenol 3 only as needed, if you're able to start work he can start from tomorrow, that was take 2 more days off  - acetaminophen-codeine (TYLENOL/CODEINE #3) 300-30 MG per tablet; Take 1 tablet by mouth every 8 hours as needed for Pain for up to 3 days. Intended supply: 7 days. Take lowest dose possible to manage pain. Dispense: 9 tablet; Refill: 0    2. Cellulitis of left upper extremity  -  Resolved    3. Swelling of upper arm  -Resolved    FMLA papers filled out. No orders of the defined types were placed in this encounter. Medications Discontinued During This Encounter   Medication Reason    sulfamethoxazole-trimethoprim (BACTRIM DS) 800-160 MG per tablet Therapy completed    rivaroxaban (XARELTO) 10 MG TABS tablet Therapy completed       Ayannaie received counseling on the following healthy behaviors: nutrition, exercise and medication adherence  Reviewed prior labs and health maintenance  Continue current medications, diet and exercise. Discussed use, benefit, and side effects of prescribed medications. Barriers to medication compliance addressed. Patient given educational materials - see patient instructions  Was a self-tracking handout given in paper form or via [facility]? Yes    Requested Prescriptions     Signed Prescriptions Disp Refills    acetaminophen-codeine (TYLENOL/CODEINE #3) 300-30 MG per tablet 9 tablet 0     Sig: Take 1 tablet by mouth every 8 hours as needed for Pain for up to 3 days. Intended supply: 7 days. Take lowest dose possible to manage pain. All patient questions answered. Patient voiced understanding. Quality Measures    Body mass index is 23.53 kg/m². Normal. Weight control planned discussed Healthy diet and regular exercise. BP: 125/80 Blood pressure is normal. Treatment plan consists of No treatment change needed.     No results found for: [address], LDLCHOLESTEROL,

## 2018-12-13 ENCOUNTER — TELEPHONE (OUTPATIENT)
Dept: FAMILY MEDICINE CLINIC | Age: 23
End: 2018-12-13

## 2018-12-13 NOTE — ADDENDUM NOTE
[facility]: 12/13/2018 01:36 PM     Modules accepted: Level of Service

## 2018-12-13 NOTE — TELEPHONE ENCOUNTER
Patient seen yesterday . She said Tylenol 3 is making her sleep she is asking if you can put her on Lyrica, she is not able to take Gabapentin she is on Mood stabilizer. She will be faxing a form for to return to work restriction form , she said she did have discussion with you  about restriction on her LT hand typing, lifting not able to make Iv's .   Please Advice

## 2018-12-20 ENCOUNTER — OFFICE VISIT (OUTPATIENT)
Dept: FAMILY MEDICINE CLINIC | Age: 23
End: 2018-12-20
Payer: COMMERCIAL

## 2018-12-20 ENCOUNTER — TELEPHONE (OUTPATIENT)
Dept: FAMILY MEDICINE CLINIC | Age: 23
End: 2018-12-20

## 2018-12-20 VITALS
WEIGHT: 150.4 LBS | BODY MASS INDEX: 24.17 KG/M2 | DIASTOLIC BLOOD PRESSURE: 70 MMHG | OXYGEN SATURATION: 100 % | HEART RATE: 94 BPM | SYSTOLIC BLOOD PRESSURE: 111 MMHG | HEIGHT: 66 IN

## 2018-12-20 DIAGNOSIS — I80.8 SUPERFICIAL THROMBOPHLEBITIS OF LEFT UPPER EXTREMITY: ICD-10-CM

## 2018-12-20 DIAGNOSIS — G58.9 NERVE ENTRAPMENT SYNDROME: Primary | ICD-10-CM

## 2018-12-20 PROCEDURE — 99214 OFFICE O/P EST MOD 30 MIN: CPT | Performed by: FAMILY MEDICINE

## 2018-12-20 RX ORDER — GABAPENTIN 100 MG/1
300 CAPSULE ORAL 3 TIMES DAILY
Qty: 90 CAPSULE | Refills: 0 | Status: SHIPPED | OUTPATIENT
Start: 2018-12-20 | End: 2018-12-20 | Stop reason: DRUGHIGH

## 2018-12-20 RX ORDER — LIDOCAINE 40 MG/G
CREAM TOPICAL
Qty: 45 G | Refills: 3 | Status: SHIPPED | OUTPATIENT
Start: 2018-12-20

## 2018-12-20 RX ORDER — METHYLPREDNISOLONE 4 MG/1
TABLET ORAL
Qty: 10 TABLET | Refills: 0 | Status: SHIPPED | OUTPATIENT
Start: 2018-12-20 | End: 2019-02-07 | Stop reason: ALTCHOICE

## 2018-12-20 RX ORDER — GABAPENTIN 100 MG/1
100 CAPSULE ORAL 3 TIMES DAILY
Qty: 90 CAPSULE | Refills: 0 | Status: SHIPPED | OUTPATIENT
Start: 2018-12-20 | End: 2019-02-07 | Stop reason: SDUPTHER

## 2018-12-20 ASSESSMENT — ENCOUNTER SYMPTOMS
PHOTOPHOBIA: 0
COUGH: 0
WHEEZING: 0
RHINORRHEA: 0
SHORTNESS OF BREATH: 0

## 2018-12-21 ENCOUNTER — OFFICE VISIT (OUTPATIENT)
Dept: NEUROLOGY | Age: 23
End: 2018-12-21
Payer: COMMERCIAL

## 2018-12-21 VITALS
HEART RATE: 85 BPM | HEIGHT: 66 IN | BODY MASS INDEX: 24.2 KG/M2 | DIASTOLIC BLOOD PRESSURE: 82 MMHG | WEIGHT: 150.6 LBS | SYSTOLIC BLOOD PRESSURE: 116 MMHG

## 2018-12-21 DIAGNOSIS — I80.8 SUPERFICIAL THROMBOPHLEBITIS OF LEFT UPPER EXTREMITY: ICD-10-CM

## 2018-12-21 DIAGNOSIS — S54.12XS INJURY OF MEDIAN NERVE AT LEFT FOREARM LEVEL, SEQUELA: Primary | ICD-10-CM

## 2018-12-21 PROCEDURE — 99244 OFF/OP CNSLTJ NEW/EST MOD 40: CPT | Performed by: PSYCHIATRY & NEUROLOGY

## 2018-12-21 RX ORDER — AMITRIPTYLINE HYDROCHLORIDE 25 MG/1
TABLET, FILM COATED ORAL
Qty: 120 TABLET | Refills: 1 | Status: SHIPPED | OUTPATIENT
Start: 2018-12-21 | End: 2019-01-21

## 2018-12-21 NOTE — PROGRESS NOTES
12/18/2018 (Exact Date)   BMI 24.31 kg/m²                                          .                                                                                                     General Appearance:  Alert, cooperative, no signs of distress, appears stated age   Head:  Normocephalic, no signs of trauma   Eyes:  Conjunctiva/corneas clear;  eyelids intact   Ears:  Normal external ear and canals   Nose: Nares normal, mucosa normal, no drainage    Throat: Lips and tongue normal; teeth normal;  gums normal   Neck: Supple, intact flexion, extension and rotation;   trachea midline;  no adenopathy;   thyroid: not enlarged;   no carotid pulse abnormality   Back:   Symmetric, no curvature, ROM adequate   Lungs:   Respirations unlabored   Chest Wall:  No deformity   Heart:  Regular rate and rhythm                 Extremities: Extremities normal, no cyanosis, no edema   Pulses: Symmetric over head and neck   Skin: Skin color, texture normal, no rashes, no lesions               Neurological Examination    Mental status    Alert and oriented x 3; intact memory with no confusion, speech or language problems; no hallucinations or delusions  Fund of information appropriate for level of education    Cranial nerves    II - visual fields intact to confrontation , fundoscopy showed no  papiledema                                                III, IV, VI - extra-ocular muscles full: no pupillary defect; no MAURO, no nystagmus, no ptosis   V - normal facial sensation                                                               VII - normal facial symmetry                                                             VIII - intact hearing                                                                             IX, X - symmetrical palate                                                                  XI - shoulder shrug 4/5 on L                                                       XII - tongue midline without atrophy or occurred.

## 2019-01-10 ENCOUNTER — OFFICE VISIT (OUTPATIENT)
Dept: NEUROLOGY | Age: 24
End: 2019-01-10
Payer: COMMERCIAL

## 2019-01-10 DIAGNOSIS — S54.12XS INJURY OF MEDIAN NERVE AT LEFT FOREARM LEVEL, SEQUELA: ICD-10-CM

## 2019-01-10 DIAGNOSIS — G56.12: Primary | ICD-10-CM

## 2019-01-10 PROCEDURE — 95913 NRV CNDJ TEST 13/> STUDIES: CPT | Performed by: PSYCHIATRY & NEUROLOGY

## 2019-01-10 PROCEDURE — 95886 MUSC TEST DONE W/N TEST COMP: CPT | Performed by: PSYCHIATRY & NEUROLOGY

## 2019-01-18 ENCOUNTER — NURSE TRIAGE (OUTPATIENT)
Dept: OTHER | Facility: CLINIC | Age: 24
End: 2019-01-18

## 2019-01-18 ENCOUNTER — TELEPHONE (OUTPATIENT)
Dept: NEUROLOGY | Age: 24
End: 2019-01-18

## 2019-01-21 RX ORDER — DULOXETIN HYDROCHLORIDE 60 MG/1
60 CAPSULE, DELAYED RELEASE ORAL DAILY
Qty: 30 CAPSULE | Refills: 3 | Status: SHIPPED | OUTPATIENT
Start: 2019-01-21 | End: 2020-08-14

## 2019-02-07 ENCOUNTER — OFFICE VISIT (OUTPATIENT)
Dept: NEUROLOGY | Age: 24
End: 2019-02-07
Payer: COMMERCIAL

## 2019-02-07 VITALS
BODY MASS INDEX: 24.91 KG/M2 | HEIGHT: 66 IN | WEIGHT: 155 LBS | SYSTOLIC BLOOD PRESSURE: 124 MMHG | DIASTOLIC BLOOD PRESSURE: 83 MMHG | HEART RATE: 132 BPM

## 2019-02-07 DIAGNOSIS — G58.9 NERVE ENTRAPMENT SYNDROME: ICD-10-CM

## 2019-02-07 DIAGNOSIS — S44.12XS: Primary | ICD-10-CM

## 2019-02-07 PROCEDURE — 99214 OFFICE O/P EST MOD 30 MIN: CPT | Performed by: PSYCHIATRY & NEUROLOGY

## 2019-02-07 RX ORDER — AMITRIPTYLINE HYDROCHLORIDE 100 MG/1
100 TABLET, FILM COATED ORAL NIGHTLY
Qty: 90 TABLET | Refills: 1 | Status: SHIPPED | OUTPATIENT
Start: 2019-02-07 | End: 2019-03-21 | Stop reason: ALTCHOICE

## 2019-02-07 RX ORDER — GABAPENTIN 100 MG/1
100 CAPSULE ORAL 3 TIMES DAILY
Qty: 90 CAPSULE | Refills: 1 | Status: SHIPPED | OUTPATIENT
Start: 2019-02-07 | End: 2020-08-14

## 2019-02-21 ENCOUNTER — OFFICE VISIT (OUTPATIENT)
Dept: FAMILY MEDICINE CLINIC | Age: 24
End: 2019-02-21
Payer: COMMERCIAL

## 2019-02-21 VITALS
BODY MASS INDEX: 25.49 KG/M2 | OXYGEN SATURATION: 96 % | RESPIRATION RATE: 18 BRPM | DIASTOLIC BLOOD PRESSURE: 76 MMHG | TEMPERATURE: 98.3 F | HEIGHT: 66 IN | WEIGHT: 158.6 LBS | SYSTOLIC BLOOD PRESSURE: 125 MMHG | HEART RATE: 102 BPM

## 2019-02-21 DIAGNOSIS — F41.0 PANIC ATTACKS: ICD-10-CM

## 2019-02-21 DIAGNOSIS — F41.1 GAD (GENERALIZED ANXIETY DISORDER): ICD-10-CM

## 2019-02-21 DIAGNOSIS — T14.8XXA COMPRESSION INJURY OF NERVE: ICD-10-CM

## 2019-02-21 DIAGNOSIS — R00.0 TACHYCARDIA: Primary | ICD-10-CM

## 2019-02-21 PROCEDURE — 99214 OFFICE O/P EST MOD 30 MIN: CPT | Performed by: NURSE PRACTITIONER

## 2019-02-21 ASSESSMENT — ENCOUNTER SYMPTOMS
COUGH: 0
EYE DISCHARGE: 0
BLOOD IN STOOL: 0
SHORTNESS OF BREATH: 0
ABDOMINAL PAIN: 0

## 2019-02-21 ASSESSMENT — PATIENT HEALTH QUESTIONNAIRE - PHQ9
SUM OF ALL RESPONSES TO PHQ9 QUESTIONS 1 & 2: 2
SUM OF ALL RESPONSES TO PHQ QUESTIONS 1-9: 2
SUM OF ALL RESPONSES TO PHQ QUESTIONS 1-9: 2
2. FEELING DOWN, DEPRESSED OR HOPELESS: 1
1. LITTLE INTEREST OR PLEASURE IN DOING THINGS: 1

## 2019-03-14 ENCOUNTER — TELEPHONE (OUTPATIENT)
Dept: NEUROLOGY | Age: 24
End: 2019-03-14

## 2019-03-15 ENCOUNTER — TELEPHONE (OUTPATIENT)
Dept: NEUROLOGY | Age: 24
End: 2019-03-15

## 2019-03-18 ENCOUNTER — TELEPHONE (OUTPATIENT)
Dept: NEUROLOGY | Age: 24
End: 2019-03-18

## 2019-03-21 ENCOUNTER — OFFICE VISIT (OUTPATIENT)
Dept: FAMILY MEDICINE CLINIC | Age: 24
End: 2019-03-21
Payer: COMMERCIAL

## 2019-03-21 VITALS
OXYGEN SATURATION: 97 % | BODY MASS INDEX: 24.98 KG/M2 | TEMPERATURE: 98.4 F | HEIGHT: 66 IN | WEIGHT: 155.4 LBS | HEART RATE: 71 BPM | SYSTOLIC BLOOD PRESSURE: 115 MMHG | DIASTOLIC BLOOD PRESSURE: 75 MMHG

## 2019-03-21 DIAGNOSIS — G58.9 NERVE ENTRAPMENT SYNDROME: ICD-10-CM

## 2019-03-21 DIAGNOSIS — T14.8XXA COMPRESSION INJURY OF NERVE: ICD-10-CM

## 2019-03-21 DIAGNOSIS — I80.8 SUPERFICIAL THROMBOPHLEBITIS OF LEFT UPPER EXTREMITY: ICD-10-CM

## 2019-03-21 DIAGNOSIS — R00.0 TACHYCARDIA: Primary | ICD-10-CM

## 2019-03-21 PROBLEM — R19.5 STOOL DISCOLORATION: Status: RESOLVED | Noted: 2018-12-06 | Resolved: 2019-03-21

## 2019-03-21 PROBLEM — M79.602 PAIN OF LEFT UPPER EXTREMITY: Status: RESOLVED | Noted: 2018-12-05 | Resolved: 2019-03-21

## 2019-03-21 PROCEDURE — 99213 OFFICE O/P EST LOW 20 MIN: CPT | Performed by: FAMILY MEDICINE

## 2019-03-21 RX ORDER — FERROUS SULFATE 325(65) MG
325 TABLET ORAL
COMMUNITY

## 2019-03-21 RX ORDER — FOLIC ACID 1 MG/1
3 TABLET ORAL DAILY
COMMUNITY

## 2019-03-21 ASSESSMENT — ENCOUNTER SYMPTOMS
PHOTOPHOBIA: 0
RHINORRHEA: 0
VOMITING: 0
SHORTNESS OF BREATH: 0
COUGH: 0
SINUS PRESSURE: 0
BACK PAIN: 0
NAUSEA: 0
ABDOMINAL PAIN: 0
WHEEZING: 0
CONSTIPATION: 0

## 2019-05-08 ENCOUNTER — OFFICE VISIT (OUTPATIENT)
Dept: FAMILY MEDICINE CLINIC | Age: 24
End: 2019-05-08
Payer: COMMERCIAL

## 2019-05-08 VITALS
OXYGEN SATURATION: 98 % | HEIGHT: 66 IN | WEIGHT: 161.4 LBS | HEART RATE: 95 BPM | SYSTOLIC BLOOD PRESSURE: 128 MMHG | DIASTOLIC BLOOD PRESSURE: 66 MMHG | BODY MASS INDEX: 25.94 KG/M2

## 2019-05-08 DIAGNOSIS — O21.0 HYPEREMESIS GRAVIDARUM: ICD-10-CM

## 2019-05-08 DIAGNOSIS — R11.10 HYPEREMESIS: Primary | ICD-10-CM

## 2019-05-08 PROCEDURE — 99213 OFFICE O/P EST LOW 20 MIN: CPT | Performed by: FAMILY MEDICINE

## 2019-05-08 RX ORDER — ONDANSETRON 4 MG/1
4 TABLET, FILM COATED ORAL EVERY 8 HOURS PRN
COMMUNITY
End: 2019-05-08 | Stop reason: SDUPTHER

## 2019-05-08 RX ORDER — ONDANSETRON 4 MG/1
4 TABLET, FILM COATED ORAL EVERY 6 HOURS PRN
Qty: 30 TABLET | Refills: 0 | Status: SHIPPED | OUTPATIENT
Start: 2019-05-08 | End: 2019-05-14

## 2019-05-08 RX ORDER — DOXYLAMINE SUCCINATE AND PYRIDOXINE HYDROCHLORIDE, DELAYED RELEASE TABLETS 10 MG/10 MG 10; 10 MG/1; MG/1
1-2 TABLET, DELAYED RELEASE ORAL NIGHTLY PRN
Qty: 60 TABLET | Refills: 1 | Status: SHIPPED | OUTPATIENT
Start: 2019-05-08 | End: 2019-06-07

## 2019-05-08 ASSESSMENT — ENCOUNTER SYMPTOMS
WHEEZING: 0
NAUSEA: 1
SHORTNESS OF BREATH: 0
BLOOD IN STOOL: 0
ABDOMINAL PAIN: 0
COUGH: 0
VOMITING: 1

## 2019-05-08 NOTE — PROGRESS NOTES
Visit Information    Have you changed or started any medications since your last visit including any over-the-counter medicines, vitamins, or herbal medicines? no   Are you having any side effects from any of your medications? -  no  Have you stopped taking any of your medications? Is so, why? -  no    Have you seen any other physician or provider since your last visit? No  Have you had any other diagnostic tests since your last visit? Yes - Records Obtained  Have you been seen in the emergency room and/or had an admission to a hospital since we last saw you? Yes - Records Obtained  Have you had your routine dental cleaning in the past 6 months? no    Have you activated your [email or web address] account? If not, what are your barriers?  Yes     Patient Care Team:  Candis Magana MD as PCP - General (Family Medicine)  Candis Magana MD as PCP - [facility] Attributed Provider    Medical History Review  Past Medical, Family, and Social History reviewed and does contribute to the patient presenting condition    Health Maintenance   Topic Date Due    Varicella Vaccine (1 of 2 - 13+ 2-dose series) 02/15/2006    HIV screen  02/15/2008    Cervical cancer screen  06/01/2019 (Originally 5/5/2018)    HPV vaccine (1 - Female 3-dose series) 03/21/2020 (Originally 2/15/2008)    DTaP/Tdap/Td vaccine (2 - Td) 05/02/2028    Flu vaccine  Completed    Pneumococcal 0-64 years Vaccine  Aged Out

## 2019-05-08 NOTE — PROGRESS NOTES
Chief Complaint   Patient presents with    ED Follow-up     hyperemesana Seth  here today for follow up on chronic medical problems, go over labs and/or diagnostic studies, and medication refills. ED Follow-up (hyperemesis )      HPI: Patient is here for sick call complaining of nausea and vomiting persistently has been recently diagnosed with pregnancy. Patient is 8 weeks pregnant take Zofran as needed. She could not get appointment with the [facility]. Patient has stopped her depression medications. She did not try over-the-counter medications. Patient reports about 5 times vomiting each time. /66   Pulse 95   Ht 5' 6\" (1.676 m)   Wt 161 lb 6.4 oz (73.2 kg)   LMP  (LMP Unknown)   SpO2 98% Comment: resting @ [facility]  BMI 26.05 kg/m²    Body mass index is 26.05 kg/m². Wt Readings from Last 3 Encounters:   05/08/19 161 lb 6.4 oz (73.2 kg)   03/21/19 155 lb 6.4 oz (70.5 kg)   02/21/19 158 lb 9.6 oz (71.9 kg)        []Negative depression screening. PHQ Scores 2/21/2019 5/2/2018 9/14/2016   PHQ2 Score 2 0 0   PHQ9 Score 2 0 0      [x]1-4 = Minimal depression   []5-9 = Milddepression   []10-14 = Moderate depression   []15-19 = Moderately severe depression   []20-27 = Severe depression    Discussed testing with the patient and all questions fully answered.     Admission on 11/29/2018, Discharged on 12/03/2018   Component Date Value Ref Range Status    WBC 11/29/2018 19.5* 3.5 - 11.0 k/uL Final    RBC 11/29/2018 4.96  4.0 - 5.2 m/uL Final    Hemoglobin 11/29/2018 14.5  12.0 - 16.0 g/dL Final    Hematocrit 11/29/2018 43.5  36 - 46 % Final    MCV 11/29/2018 87.7  80 - 100 fL Final    MCH 11/29/2018 29.3  26 - 34 pg Final    MCHC 11/29/2018 33.4  31 - 37 g/dL Final    RDW 11/29/2018 12.9  11.5 - 14.9 % Final    Platelets 75/86/5950 276  150 - 450 k/uL Final    MPV 11/29/2018 8.4  6.0 - 12.0 fL Final    NRBC Automated 11/29/2018 NOT REPORTED  per 100 WBC Final    Glucose 11/29/2018 111* 70 - 99 mg/dL Final    BUN 11/29/2018 15  6 - 20 mg/dL Final    CREATININE 11/29/2018 0.78  0.50 - 0.90 mg/dL Final    Bun/Cre Ratio 11/29/2018 NOT REPORTED  9 - 20 Final    Calcium 11/29/2018 8.8  8.6 - 10.4 mg/dL Final    Sodium 11/29/2018 141  135 - 144 mmol/L Final    Potassium 11/29/2018 4.3  3.7 - 5.3 mmol/L Final    Chloride 11/29/2018 105  98 - 107 mmol/L Final    CO2 11/29/2018 23  20 - 31 mmol/L Final    Anion Gap 11/29/2018 13  9 - 17 mmol/L Final    Alkaline Phosphatase 11/29/2018 52  35 - 104 U/L Final    ALT 11/29/2018 9  5 - 33 U/L Final    AST 11/29/2018 11  <32 U/L Final    Total Bilirubin 11/29/2018 0.75  0.3 - 1.2 mg/dL Final    Total Protein 11/29/2018 7.5  6.4 - 8.3 g/dL Final    Alb 11/29/2018 4.4  3.5 - 5.2 g/dL Final    Albumin/Globulin Ratio 11/29/2018 NOT REPORTED  1.0 - 2.5 Final    GFR Non- 11/29/2018 >60  >60 mL/min Final    GFR  11/29/2018 >60  >60 mL/min Final    GFR Comment 11/29/2018        Final    Comment: Average GFR for 20-28 years old:   80 mL/min/1.73sq m  Chronic Kidney Disease:   <60 mL/min/1.73sq m  Kidney failure:   <15 mL/min/1.73sq m              eGFR calculated using average adult body mass. Additional eGFR calculator   available at:        [email or web address]            GFR Staging 11/29/2018 NOT REPORTED   Final    Lipase 11/29/2018 21  13 - 60 U/L Final    Color, UA 11/29/2018 DARK YELLOW* YEL Final    Turbidity UA 11/29/2018 CLEAR  CLEAR Final    Glucose, Ur 11/29/2018 NEGATIVE  NEG Final    Bilirubin Urine 11/29/2018 Presumptive positive. Unable to confirm due to unavailability of reagent. * NEG Final    Ketones, Urine 11/29/2018 MOD* NEG Final    Specific [place], UA 11/29/2018 1.028  1.000 - 1.030 Final    Urine Hgb 11/29/2018 TRACE* NEG Final    pH, UA 11/29/2018 5.5  5.0 - 8.0 Final    Protein, UA 11/29/2018 NEGATIVE  NEG Final    Urobilinogen, Urine 11/29/2018 Normal  NORM Final    Nitrite, Urine 11/29/2018 NEGATIVE  NEG Final    Leukocyte Esterase, Urine 11/29/2018 SMALL* NEG Final    Urinalysis Comments 11/29/2018 NOT REPORTED   Final    HCG(Urine) Pregnancy Test 11/29/2018 NEGATIVE  NEG Final    Comment: Specimens with hCG levels near the threshold of the test (25 mIU/mL) may give a   negative or indeterminate result. In such cases, another test should be   performed with a new specimen in 48-72 hours. If early pregnancy is suspected   clinically in this setting, correlation with quantitative serum b-hCG level is   suggested.  - 11/29/2018        Final    WBC, UA 11/29/2018 10 TO 20  /HPF Final    RBC, UA 11/29/2018 0 TO 2  /HPF Final    Casts UA 11/29/2018 NOT REPORTED  /LPF Final    Crystals UA 11/29/2018 NOT REPORTED  NONE /HPF Final    Epithelial Cells UA 11/29/2018 10 TO 20  /HPF Final    Renal Epithelial, Urine 11/29/2018 NOT REPORTED  0 /HPF Final    Bacteria, UA 11/29/2018 FEW* NONE Final    Mucus, UA 11/29/2018 3+* NONE Final    Trichomonas, UA 11/29/2018 NOT REPORTED  NONE Final    Amorphous, UA 11/29/2018 NOT REPORTED  NONE Final    Other Observations UA 11/29/2018 NOT REPORTED  NREQ Final    Yeast, UA 11/29/2018 NOT REPORTED  NONE Final    Specimen Description 11/29/2018 . NASOPHARYNGEAL SWAB   Final    Special Requests 11/29/2018 NOT REPORTED   Final    Direct Exam 11/29/2018 PRESUMPTIVE NEGATIVE for Influenza A + B antigens. Final    Direct Exam 11/29/2018 PCR testing to confirm this result is available upon request.  Specimen will be   Final    Direct Exam 11/29/2018  saved in the laboratory for 7 days. Please call [phone] if PCR testing is   Final    Direct Exam 11/29/2018  indicated.    Final    Status 11/29/2018 FINAL 11/29/2018   Final    WBC 12/02/2018 7.3  3.5 - 11.0 k/uL Final    RBC 12/02/2018 4.20  4.0 - 5.2 m/uL Final    Hemoglobin 12/02/2018 12.0  12.0 - 16.0 g/dL Final    Hematocrit 12/02/2018 36.7  36 - 46 % Final    MCV 12/02/2018 87.5  80 - 100 fL Final    MCH 12/02/2018 28.7  26 - 34 pg Final    MCHC 12/02/2018 32.8  31 - 37 g/dL Final    RDW 12/02/2018 12.9  11.5 - 14.9 % Final    Platelets 62/86/9043 249  150 - 450 k/uL Final    MPV 12/02/2018 7.9  6.0 - 12.0 fL Final    NRBC Automated 12/02/2018 NOT REPORTED  per 100 WBC Final    Glucose 12/02/2018 94  70 - 99 mg/dL Final    BUN 12/02/2018 8  6 - 20 mg/dL Final    CREATININE 12/02/2018 0.76  0.50 - 0.90 mg/dL Final    Bun/Cre Ratio 12/02/2018 NOT REPORTED  9 - 20 Final    Calcium 12/02/2018 8.0* 8.6 - 10.4 mg/dL Final    Sodium 12/02/2018 139  135 - 144 mmol/L Final    Potassium 12/02/2018 4.1  3.7 - 5.3 mmol/L Final    Chloride 12/02/2018 106  98 - 107 mmol/L Final    CO2 12/02/2018 23  20 - 31 mmol/L Final    Anion Gap 12/02/2018 10  9 - 17 mmol/L Final    GFR Non- 12/02/2018 >60  >60 mL/min Final    GFR  12/02/2018 >60  >60 mL/min Final    GFR Comment 12/02/2018        Final    Comment: Average GFR for 20-28 years old:   80 mL/min/1.73sq m  Chronic Kidney Disease:   <60 mL/min/1.73sq m  Kidney failure:   <15 mL/min/1.73sq m              eGFR calculated using average adult body mass.  Additional eGFR calculator   available at:        [email or web address]            GFR Staging 12/02/2018 NOT REPORTED   Final    WBC 12/03/2018 7.5  3.5 - 11.0 k/uL Final    RBC 12/03/2018 4.21  4.0 - 5.2 m/uL Final    Hemoglobin 12/03/2018 12.2  12.0 - 16.0 g/dL Final    Hematocrit 12/03/2018 36.7  36 - 46 % Final    MCV 12/03/2018 87.2  80 - 100 fL Final    MCH 12/03/2018 28.9  26 - 34 pg Final    MCHC 12/03/2018 33.2  31 - 37 g/dL Final    RDW 12/03/2018 12.9  11.5 - 14.9 % Final    Platelets 82/01/0686 236  150 - 450 k/uL Final    MPV 12/03/2018 7.8  6.0 - 12.0 fL Final    NRBC Automated 12/03/2018 NOT REPORTED  per 100 WBC Final    Glucose 12/03/2018 94  70 - 99 mg/dL Final    BUN 12/03/2018 8  6 - 20 mg/dL Final    CREATININE 12/03/2018 0.87  0.50 - 0.90 mg/dL Final    Bun/Cre Ratio 12/03/2018 NOT REPORTED  9 - 20 Final    Calcium 12/03/2018 8.3* 8.6 - 10.4 mg/dL Final    Sodium 12/03/2018 140  135 - 144 mmol/L Final    Potassium 12/03/2018 4.7  3.7 - 5.3 mmol/L Final    Chloride 12/03/2018 109* 98 - 107 mmol/L Final    CO2 12/03/2018 25  20 - 31 mmol/L Final    Anion Gap 12/03/2018 6* 9 - 17 mmol/L Final    GFR Non- 12/03/2018 >60  >60 mL/min Final    GFR  12/03/2018 >60  >60 mL/min Final    GFR Comment 12/03/2018        Final    Comment: Average GFR for 20-28 years old:   80 mL/min/1.73sq m  Chronic Kidney Disease:   <60 mL/min/1.73sq m  Kidney failure:   <15 mL/min/1.73sq m              eGFR calculated using average adult body mass.  Additional eGFR calculator   available at:        [email or web address]            GFR Staging 12/03/2018 NOT REPORTED   Final         Most recent labs reviewed:     Lab Results   Component Value Date    WBC 7.5 12/03/2018    HGB 12.2 12/03/2018    HCT 36.7 12/03/2018    MCV 87.2 12/03/2018     12/03/2018       @BRIEFLAB(NA,K,CL,CO2,BUN,CREATININE,GLUCOSE,CALCIUM)@     Lab Results   Component Value Date    ALT 9 11/29/2018    AST 11 11/29/2018    ALKPHOS 52 11/29/2018    BILITOT 0.75 11/29/2018       No results found for: TSHFT4, TSH    No results found for: CHOL  No results found for: TRIG  No results found for: HDL  No results found for: LDLCALC, LDLCHOLESTEROL  No results found for: LABVLDL, VLDL  No results found for: CHOLHDLRATIO    No results found for: LABA1C    No results found for: [id number]    No results found for: FOLATE    No results found for: IRON, TIBC, FERRITIN    No results found for: VITD25          Current Outpatient Medications   Medication Sig Dispense Refill    doxylamine-pyridoxine 10-10 MG TBEC Take 1-2 tablets by mouth nightly as needed (emesis) 60 tablet 1    ondansetron (ZOFRAN) 4 MG tablet Take 1 tablet by mouth every 6 hours as needed for Nausea or Vomiting 30 tablet 0    amphetamine-dextroamphetamine (ADDERALL XR) 20 MG extended release capsule Take 20 mg by mouth 2 times daily. Haley Shabbir Prenatal Vit-Fe Fumarate-FA (PNV PRENATAL PLUS MULTIVITAMIN) 27-1 MG TABS       ferrous sulfate 325 (65 Fe) MG tablet Take 325 mg by mouth daily (with breakfast)      folic acid (FOLVITE) 1 MG tablet Take 3 mg by mouth daily      gabapentin (NEURONTIN) 100 MG capsule Take 1 capsule by mouth 3 times daily for 30 days. . 90 capsule 1    DULoxetine (CYMBALTA) 60 MG extended release capsule Take 1 capsule by mouth daily 30 capsule 3    lidocaine (LMX) 4 % cream Apply topically every 8 hrs as needed for pain 45 g 3    ARIPiprazole (ABILIFY) 5 MG tablet Take 2.5 mg by mouth daily      Cholecalciferol (VITAMIN D3) 5000 units TABS Take 5,000 Units by mouth Daily      EPINEPHrine (EPIPEN 2-FELIPE) 0.3 MG/0.3ML SOAJ injection Inject 0.3 mg into the muscle as needed Use as directed for allergic reaction      buPROPion (WELLBUTRIN XL) 300 MG extended release tablet Take 300 mg by mouth every morning      EPINEPHrine (EPIPEN 2-FELIPE) 0.3 MG/0.3ML SOAJ injection Inject 0.3 mLs into the skin as needed (during allergies) Use as directed for allergic reaction 0.3 mL 3     No current facility-administered medications for this visit.               Social History     Socioeconomic History    Marital status:      Spouse name: Not on file    Number of children: Not on file    Years of education: Not on file    Highest education level: Not on file   Occupational History    Not on file   Social Needs    Financial resource strain: Not on file    Food insecurity:     Worry: Not on file     Inability: Not on file    Transportation needs:     Medical: Not on file     Non-medical: Not on file   Tobacco Use    Smoking status: Never Smoker    Smokeless tobacco: reactive to light and accommodation, extraocular   movements intact. ENT: Moist mucous membranes. No erythema is noted. NECK: Supple. No masses. No lymphadenopathy. CARDIOVASCULAR: Regular rate and rhythm. PULMONARY: Lungs are clear to auscultation bilaterally. ABDOMEN: Soft, nontender, nondistended. Positive bowel sounds      ASSESSMENT AND PLAN      1. Hyperemesis  Zofran refilled, discussed with patient to take only as needed  - doxylamine-pyridoxine 10-10 MG TBEC; Take 1-2 tablets by mouth nightly as needed (emesis)  Dispense: 60 tablet; Refill: 1  - ondansetron (ZOFRAN) 4 MG tablet; Take 1 tablet by mouth every 6 hours as needed for Nausea or Vomiting  Dispense: 30 tablet; Refill: 0    2. Hyperemesis gravidarum    - doxylamine-pyridoxine 10-10 MG TBEC; Take 1-2 tablets by mouth nightly as needed (emesis)  Dispense: 60 tablet; Refill: 1  - ondansetron (ZOFRAN) 4 MG tablet; Take 1 tablet by mouth every 6 hours as needed for Nausea or Vomiting  Dispense: 30 tablet; Refill: 0      No orders of the defined types were placed in this encounter. Medications Discontinued During This Encounter   Medication Reason    ondansetron (ZOFRAN) 4 MG tablet REORDER       Dustie received counseling on the following healthy behaviors: nutrition, exercise and medication adherence  Reviewed prior labs and health maintenance. Continue current medications, diet and exercise. Discussed use, benefit, and side effects of prescribed medications. Barriers to medication compliance addressed. Patient given educational materials - see patient instructions. All patient questions answered. Patient voiced understanding. The patient'spast medical, surgical, social, and family history as well as her   current medications and allergies were reviewed as documented in today's encounter. Medications, labs, diagnostic studies, consultations andfollow-up as documented in this encounter.     Return if symptoms worsen or fail to improve. Patient wasseen with total face to face time of 15 minutes. More than 50% of this visit was counseling and education. Future Appointments   Date Time Provider Chery Corcoran   9/23/2019  7:30 AM [facility]   11/18/2019  8:00 AM [facility]     This note was completed by using the assistance of a speech-recognition program. However, inadvertent computerized transcription errors may be present. Althoughevery effort was made to ensure accuracy, no guarantees can be provided that every mistake has been identified and corrected by editing.   Electronically signed by Joshua Leon MD on 5/8/2019  7:30 PM

## 2019-09-26 ENCOUNTER — OFFICE VISIT (OUTPATIENT)
Dept: FAMILY MEDICINE CLINIC | Age: 24
End: 2019-09-26
Payer: COMMERCIAL

## 2019-09-26 VITALS
HEIGHT: 66 IN | BODY MASS INDEX: 30.05 KG/M2 | HEART RATE: 103 BPM | SYSTOLIC BLOOD PRESSURE: 105 MMHG | DIASTOLIC BLOOD PRESSURE: 67 MMHG | OXYGEN SATURATION: 96 % | WEIGHT: 187 LBS

## 2019-09-26 DIAGNOSIS — G58.9 NERVE ENTRAPMENT SYNDROME: Primary | ICD-10-CM

## 2019-09-26 PROCEDURE — 99213 OFFICE O/P EST LOW 20 MIN: CPT | Performed by: FAMILY MEDICINE

## 2019-09-26 ASSESSMENT — ENCOUNTER SYMPTOMS
ABDOMINAL PAIN: 0
ABDOMINAL DISTENTION: 0
CONSTIPATION: 0
WHEEZING: 0
PHOTOPHOBIA: 0
SHORTNESS OF BREATH: 0
NAUSEA: 0

## 2019-09-26 NOTE — PROGRESS NOTES
WBC Final    Glucose 11/29/2018 111* 70 - 99 mg/dL Final    BUN 11/29/2018 15  6 - 20 mg/dL Final    CREATININE 11/29/2018 0.78  0.50 - 0.90 mg/dL Final    Bun/Cre Ratio 11/29/2018 NOT REPORTED  9 - 20 Final    Calcium 11/29/2018 8.8  8.6 - 10.4 mg/dL Final    Sodium 11/29/2018 141  135 - 144 mmol/L Final    Potassium 11/29/2018 4.3  3.7 - 5.3 mmol/L Final    Chloride 11/29/2018 105  98 - 107 mmol/L Final    CO2 11/29/2018 23  20 - 31 mmol/L Final    Anion Gap 11/29/2018 13  9 - 17 mmol/L Final    Alkaline Phosphatase 11/29/2018 52  35 - 104 U/L Final    ALT 11/29/2018 9  5 - 33 U/L Final    AST 11/29/2018 11  <32 U/L Final    Total Bilirubin 11/29/2018 0.75  0.3 - 1.2 mg/dL Final    Total Protein 11/29/2018 7.5  6.4 - 8.3 g/dL Final    Alb 11/29/2018 4.4  3.5 - 5.2 g/dL Final    Albumin/Globulin Ratio 11/29/2018 NOT REPORTED  1.0 - 2.5 Final    GFR Non- 11/29/2018 >60  >60 mL/min Final    GFR  11/29/2018 >60  >60 mL/min Final    GFR Comment 11/29/2018        Final    Comment: Average GFR for 20-28 years old:   80 mL/min/1.73sq m  Chronic Kidney Disease:   <60 mL/min/1.73sq m  Kidney failure:   <15 mL/min/1.73sq m              eGFR calculated using average adult body mass. Additional eGFR calculator   available at:        [email or web address]            GFR Staging 11/29/2018 NOT REPORTED   Final    Lipase 11/29/2018 21  13 - 60 U/L Final    Color, UA 11/29/2018 DARK YELLOW* YEL Final    Turbidity UA 11/29/2018 CLEAR  CLEAR Final    Glucose, Ur 11/29/2018 NEGATIVE  NEG Final    Bilirubin Urine 11/29/2018 Presumptive positive. Unable to confirm due to unavailability of reagent. * NEG Final    Ketones, Urine 11/29/2018 MOD* NEG Final    Specific [place], UA 11/29/2018 1.028  1.000 - 1.030 Final    Urine Hgb 11/29/2018 TRACE* NEG Final    pH, UA 11/29/2018 5.5  5.0 - 8.0 Final    Protein, UA 11/29/2018 NEGATIVE  NEG Final    Urobilinogen, Urine 11/29/2018 Normal  NORM Final    Nitrite, Urine 11/29/2018 NEGATIVE  NEG Final    Leukocyte Esterase, Urine 11/29/2018 SMALL* NEG Final    Urinalysis Comments 11/29/2018 NOT REPORTED   Final    HCG(Urine) Pregnancy Test 11/29/2018 NEGATIVE  NEG Final    Comment: Specimens with hCG levels near the threshold of the test (25 mIU/mL) may give a   negative or indeterminate result. In such cases, another test should be   performed with a new specimen in 48-72 hours. If early pregnancy is suspected   clinically in this setting, correlation with quantitative serum b-hCG level is   suggested.  - 11/29/2018        Final    WBC, UA 11/29/2018 10 TO 20  /HPF Final    RBC, UA 11/29/2018 0 TO 2  /HPF Final    Casts UA 11/29/2018 NOT REPORTED  /LPF Final    Crystals UA 11/29/2018 NOT REPORTED  NONE /HPF Final    Epithelial Cells UA 11/29/2018 10 TO 20  /HPF Final    Renal Epithelial, Urine 11/29/2018 NOT REPORTED  0 /HPF Final    Bacteria, UA 11/29/2018 FEW* NONE Final    Mucus, UA 11/29/2018 3+* NONE Final    Trichomonas, UA 11/29/2018 NOT REPORTED  NONE Final    Amorphous, UA 11/29/2018 NOT REPORTED  NONE Final    Other Observations UA 11/29/2018 NOT REPORTED  NREQ Final    Yeast, UA 11/29/2018 NOT REPORTED  NONE Final    Specimen Description 11/29/2018 . NASOPHARYNGEAL SWAB   Final    Special Requests 11/29/2018 NOT REPORTED   Final    Direct Exam 11/29/2018 PRESUMPTIVE NEGATIVE for Influenza A + B antigens. Final    Direct Exam 11/29/2018 PCR testing to confirm this result is available upon request.  Specimen will be   Final    Direct Exam 11/29/2018  saved in the laboratory for 7 days. Please call [phone] if PCR testing is   Final    Direct Exam 11/29/2018  indicated.    Final    Status 11/29/2018 FINAL 11/29/2018   Final    WBC 12/02/2018 7.3  3.5 - 11.0 k/uL Final    RBC 12/02/2018 4.20  4.0 - 5.2 m/uL Final    Hemoglobin 12/02/2018 12.0  12.0 - 16.0 g/dL Final    12/03/2018 94  70 - 99 mg/dL Final    BUN 12/03/2018 8  6 - 20 mg/dL Final    CREATININE 12/03/2018 0.87  0.50 - 0.90 mg/dL Final    Bun/Cre Ratio 12/03/2018 NOT REPORTED  9 - 20 Final    Calcium 12/03/2018 8.3* 8.6 - 10.4 mg/dL Final    Sodium 12/03/2018 140  135 - 144 mmol/L Final    Potassium 12/03/2018 4.7  3.7 - 5.3 mmol/L Final    Chloride 12/03/2018 109* 98 - 107 mmol/L Final    CO2 12/03/2018 25  20 - 31 mmol/L Final    Anion Gap 12/03/2018 6* 9 - 17 mmol/L Final    GFR Non- 12/03/2018 >60  >60 mL/min Final    GFR  12/03/2018 >60  >60 mL/min Final    GFR Comment 12/03/2018        Final    Comment: Average GFR for 20-28 years old:   80 mL/min/1.73sq m  Chronic Kidney Disease:   <60 mL/min/1.73sq m  Kidney failure:   <15 mL/min/1.73sq m              eGFR calculated using average adult body mass.  Additional eGFR calculator   available at:        [email or web address]            GFR Staging 12/03/2018 NOT REPORTED   Final         Most recent labs reviewed:     Lab Results   Component Value Date    WBC 7.5 12/03/2018    HGB 12.2 12/03/2018    HCT 36.7 12/03/2018    MCV 87.2 12/03/2018     12/03/2018       @BRIEFLAB(NA,K,CL,CO2,BUN,CREATININE,GLUCOSE,CALCIUM)@     Lab Results   Component Value Date    ALT 9 11/29/2018    AST 11 11/29/2018    ALKPHOS 52 11/29/2018    BILITOT 0.75 11/29/2018       No results found for: TSHFT4, TSH    No results found for: CHOL  No results found for: TRIG  No results found for: HDL  No results found for: LDLCALC, LDLCHOLESTEROL  No results found for: LABVLDL, VLDL  No results found for: CHOLHDLRATIO    No results found for: LABA1C    No results found for: [id number]    No results found for: FOLATE    No results found for: IRON, TIBC, FERRITIN    No results found for: VITD25          Current Outpatient Medications   Medication Sig Dispense Refill    ferrous sulfate 325 (65 Fe) MG tablet Take 325 mg by mouth daily (with breakfast)      Prenatal Vit-Fe Fumarate-FA (PNV PRENATAL PLUS MULTIVITAMIN) 27-1 MG TABS       folic acid (FOLVITE) 1 MG tablet Take 3 mg by mouth daily      gabapentin (NEURONTIN) 100 MG capsule Take 1 capsule by mouth 3 times daily for 30 days. . (Patient not taking: Reported on 9/26/2019) 90 capsule 1    DULoxetine (CYMBALTA) 60 MG extended release capsule Take 1 capsule by mouth daily (Patient not taking: Reported on 9/26/2019) 30 capsule 3    lidocaine (LMX) 4 % cream Apply topically every 8 hrs as needed for pain (Patient not taking: Reported on 9/26/2019) 45 g 3    ARIPiprazole (ABILIFY) 5 MG tablet Take 2.5 mg by mouth daily      Cholecalciferol (VITAMIN D3) 5000 units TABS Take 5,000 Units by mouth Daily      amphetamine-dextroamphetamine (ADDERALL XR) 20 MG extended release capsule Take 20 mg by mouth 2 times daily. Isaias Butler EPINEPHrine (EPIPEN 2-FELIPE) 0.3 MG/0.3ML SOAJ injection Inject 0.3 mg into the muscle as needed Use as directed for allergic reaction      buPROPion (WELLBUTRIN XL) 300 MG extended release tablet Take 300 mg by mouth every morning      EPINEPHrine (EPIPEN 2-FELIPE) 0.3 MG/0.3ML SOAJ injection Inject 0.3 mLs into the skin as needed (during allergies) Use as directed for allergic reaction (Patient not taking: Reported on 9/26/2019) 0.3 mL 3     No current facility-administered medications for this visit.               Social History     Socioeconomic History    Marital status:      Spouse name: Not on file    Number of children: Not on file    Years of education: Not on file    Highest education level: Not on file   Occupational History    Not on file   Social Needs    Financial resource strain: Not on file    Food insecurity:     Worry: Not on file     Inability: Not on file    Transportation needs:     Medical: Not on file     Non-medical: Not on file   Tobacco Use    Smoking status: Never Smoker    Smokeless tobacco: Never Used   Substance and

## 2019-09-26 NOTE — PROGRESS NOTES
Visit Information    Have you changed or started any medications since your last visit including any over-the-counter medicines, vitamins, or herbal medicines? no   Are you having any side effects from any of your medications? -  no  Have you stopped taking any of your medications? Is so, why? -  no    Have you seen any other physician or provider since your last visit? No  Have you had any other diagnostic tests since your last visit? Yes - Records Obtained  Have you been seen in the emergency room and/or had an admission to a hospital since we last saw you? Yes - Records Obtained  Have you had your routine dental cleaning in the past 6 months? no    Have you activated your [employer] account? If not, what are your barriers?  Yes     Patient Care Team:  Donovan Aase, MD as PCP - General (Family Medicine)  Donovan Aase, MD as PCP - [facility]    Medical History Review  Past Medical, Family, and Social History reviewed and does contribute to the patient presenting condition    Health Maintenance   Topic Date Due    Varicella Vaccine (1 of 2 - 13+ 2-dose series) 02/15/2006    HIV screen  02/15/2008    Cervical cancer screen  05/05/2018    Flu vaccine (1) 09/01/2019    HPV vaccine (1 - Female 3-dose series) 03/21/2020 (Originally 2/15/2008)    DTaP/Tdap/Td vaccine (2 - Td) 05/02/2028    Pneumococcal 0-64 years Vaccine  Aged Out

## 2019-10-16 ENCOUNTER — TELEPHONE (OUTPATIENT)
Dept: NEUROLOGY | Age: 24
End: 2019-10-16

## 2019-10-16 ENCOUNTER — OFFICE VISIT (OUTPATIENT)
Dept: NEUROLOGY | Age: 24
End: 2019-10-16
Payer: COMMERCIAL

## 2019-10-16 VITALS
SYSTOLIC BLOOD PRESSURE: 133 MMHG | DIASTOLIC BLOOD PRESSURE: 72 MMHG | HEART RATE: 91 BPM | WEIGHT: 191 LBS | BODY MASS INDEX: 30.84 KG/M2

## 2019-10-16 DIAGNOSIS — G58.9 NERVE ENTRAPMENT SYNDROME: Primary | ICD-10-CM

## 2019-10-16 PROCEDURE — 99213 OFFICE O/P EST LOW 20 MIN: CPT | Performed by: NURSE PRACTITIONER

## 2019-12-04 ENCOUNTER — TELEPHONE (OUTPATIENT)
Dept: INTERNAL MEDICINE CLINIC | Age: 24
End: 2019-12-04

## 2020-03-31 RX ORDER — EPINEPHRINE 0.3 MG/.3ML
0.3 INJECTION SUBCUTANEOUS PRN
Qty: 3 EACH | Refills: 2 | Status: SHIPPED | OUTPATIENT
Start: 2020-03-31

## 2020-08-14 ENCOUNTER — OFFICE VISIT (OUTPATIENT)
Dept: DERMATOLOGY | Age: 25
End: 2020-08-14
Payer: COMMERCIAL

## 2020-08-14 VITALS
DIASTOLIC BLOOD PRESSURE: 75 MMHG | SYSTOLIC BLOOD PRESSURE: 115 MMHG | HEIGHT: 66 IN | BODY MASS INDEX: 24.43 KG/M2 | HEART RATE: 93 BPM | OXYGEN SATURATION: 98 % | WEIGHT: 152 LBS | TEMPERATURE: 98.1 F

## 2020-08-14 PROCEDURE — 99214 OFFICE O/P EST MOD 30 MIN: CPT | Performed by: DERMATOLOGY

## 2020-08-14 NOTE — PATIENT INSTRUCTIONS
-Call Lactation consultant  -one year follow up  Sun Protection     There are two types of sun rays that are harmful to the skin. UVA rays cause skin aging and skin cancer, such as melanoma. UVB rays cause sunburns, cataracts, and also contribute to skin cancer. The American-Academy of Dermatology recommends that children and adults wear a broad spectrum, waterproof sunscreen with a Sun Protection Factor (SPF) of 30 or higher. It is important to check the ingredient label to be sure the sunscreen will protect the skin from both UVA and UVB sunrays. Your sunscreen should contain at least one of the following ingredients: titanium dioxide, zinc oxide, or avobenzone. Sunscreen will not be effective unless it is applied to all exposed skin. Sunscreens work best if they are applied 30 minutes before sun exposure. They should be reapplied every 2 hours and after any water exposure. Sunscreen is not perfect. It is important to use other methods to protect the skin from sun exposure also. Wear hats, sunglasses and other sun protective clothing when outdoors.   Stay in the shade during the peak hours of sun exposure between 10 AM and 4 PM.

## 2020-08-14 NOTE — PROGRESS NOTES
Dermatology Patient Note  [facility] DERMATOLOGY  [address]  [address] [place] [address]  Dept: [phone]  Dept Fax: [phone]      VISITDATE: 8/14/2020   REFERRING PROVIDER: No ref. provider found      Arleth Llamas is a 30 y.o. female  who presents today in the office for:    Follow-up (a few spots on left posterior arm she wants evaluated and a spot on her left lower leg)      HISTORY OF PRESENT ILLNESS:  30 y.o. female with history of multiple nevi presents for routine skin check.   Last skin check: > 1 year ago    Patient reports concerning lesion:    Location: left lower leg  Duration: months  Symptoms: none  Course: persistent  Prior biopsy: none  Prior treatment: none    Patient states that nevi of groin were removed when she had her baby    Dermatologic history:  States she has had 3 moles removed by Dr. Jesus Dickerson - all benign        CURRENT MEDICATIONS:   Current Outpatient Medications   Medication Sig Dispense Refill    Prenatal Vit-Fe Fumarate-FA (PNV PRENATAL PLUS MULTIVITAMIN) 27-1 MG TABS       EPINEPHrine (EPIPEN 2-FELIPE) 0.3 MG/0.3ML SOAJ injection Inject 0.3 mLs into the muscle as needed (anaphylaxis) Use as directed for allergic reaction (Patient not taking: Reported on 8/14/2020) 3 each 2    ferrous sulfate 325 (65 Fe) MG tablet Take 325 mg by mouth daily (with breakfast)      folic acid (FOLVITE) 1 MG tablet Take 3 mg by mouth daily      lidocaine (LMX) 4 % cream Apply topically every 8 hrs as needed for pain (Patient not taking: Reported on 9/26/2019) 45 g 3    Cholecalciferol (VITAMIN D3) 5000 units TABS Take 5,000 Units by mouth Daily      buPROPion (WELLBUTRIN XL) 300 MG extended release tablet Take 300 mg by mouth every morning      EPINEPHrine (EPIPEN 2-FELIPE) 0.3 MG/0.3ML SOAJ injection Inject 0.3 mLs into the skin as needed (during allergies) Use as directed for allergic reaction (Patient not taking: Reported on 8/14/2020) 0.3 mL 3     No current facility-administered medications for this visit. ALLERGIES:   No Known Allergies    SOCIAL HISTORY:  Social History     Tobacco Use    Smoking status: Never Smoker    Smokeless tobacco: Never Used   Substance Use Topics    Alcohol use: No       REVIEW OF SYSTEMS:  Review of Systems  Skin: Denies any new changing, growing orbleeding lesions or rashes except as described in the HPI   Constitutional: Denies fevers, chills, and malaise. PHYSICAL EXAM:   /75 (Site: Right Upper Arm, Position: Sitting, Cuff Size: Medium Adult)   Pulse 93   Temp 98.1 °F (36.7 °C)   Ht 5' 6\" (1.676 m)   Wt 152 lb (68.9 kg)   LMP 07/28/2020 (Exact Date)   SpO2 98%   Breastfeeding Yes   BMI 24.53 kg/m²     General Exam:  General Appearance: No acute distress, Well nourished     Neuro: Alert and oriented to person, place and time  Psych: Normal affect   Lymph Node: Not performed    Cutaneous Exam: Performed as documented in clinic note below. Full skin, which includes the head/face, neck, both arms, chest, back, abdomen, bothlegs, genitalia and/or groin and/or buttocks, digits and/or nails, was examined. Pertinent Physical Exam Findings:  Physical Exam   Skin type I, red hair and freckles  Scattered tan to brown or red homogenous macules and thin papules with regular borders on the face, trunk and upper and lower extremities  Largest nevi on left buttock. Medial lesion with eccentric darker brown macule  Biopsy scar: left abdomen with inlying repigmentation, back without repigmentation, left thigh with medial repigmentation  Firm pink-brown papule on left leg    Medical Necessity of Exam Performed:   Distribution of patient concerns    Additional Diagnostic Testing performed during exam: Not performed ,  Not performed    ASSESSMENT:  Plan of Action is as Follows:  Assessment   1.  Multiple nevi  - benign appearing on exam, many have red hue/congenital appearing  - left buttock largest with eccentric brown macule, unchanged in photographs  - discussed ABCDEs of melenoma  - Counseled on sun protection: avoidance, seek shade; use clothing/hats/scarves; use generous quantity of sunscreen, re-apply every 2 hours; vit D discussed. 2. Dermatofibroma  reassurance and education    RTC 1 year, call if concerning lesions            Patient Instructions   -Call Lactation consultant  -one year follow up  Sun Protection     There are two types of sun rays that are harmful to the skin. UVA rays cause skin aging and skin cancer, such as melanoma. UVB rays cause sunburns, cataracts, and also contribute to skin cancer. The American-Academy of Dermatology recommends that children and adults wear a broad spectrum, waterproof sunscreen with a Sun Protection Factor (SPF) of 30 or higher. It is important to check the ingredient label to be sure the sunscreen will protect the skin from both UVA and UVB sunrays. Your sunscreen should contain at least one of the following ingredients: titanium dioxide, zinc oxide, or avobenzone. Sunscreen will not be effective unless it is applied to all exposed skin. Sunscreens work best if they are applied 30 minutes before sun exposure. They should be reapplied every 2 hours and after any water exposure. Sunscreen is not perfect. It is important to use other methods to protect the skin from sun exposure also. Wear hats, sunglasses and other sun protective clothing when outdoors. Stay in the shade during the peak hours of sun exposure between 10 AM and 4 PM.        Photo surveillance performed: Yes    Follow-up: No follow-ups on file. This note was created with the assistance of a speech-recognition program.  Although the intention is to generate a document that actually reflects the content of the visit, no guarantees can be provided that every mistake has been identified and corrected byediting.     Electronically signed by Pallavi Mendiola MD on 11/19/18 at 8:55 AM

## 2020-08-21 ENCOUNTER — E-VISIT (OUTPATIENT)
Dept: FAMILY MEDICINE CLINIC | Age: 25
End: 2020-08-21
Payer: COMMERCIAL

## 2020-08-21 PROCEDURE — 99423 OL DIG E/M SVC 21+ MIN: CPT | Performed by: FAMILY MEDICINE

## 2020-08-21 RX ORDER — MECLIZINE HCL 12.5 MG/1
12.5 TABLET ORAL 3 TIMES DAILY PRN
Qty: 30 TABLET | Refills: 0 | Status: SHIPPED | OUTPATIENT
Start: 2020-08-21 | End: 2020-08-31

## 2020-09-16 ENCOUNTER — TELEPHONE (OUTPATIENT)
Dept: FAMILY MEDICINE CLINIC | Age: 25
End: 2020-09-16

## 2020-09-16 NOTE — TELEPHONE ENCOUNTER
Patient went to ED [facility] for UTI she was given Antibiotic  Keflex and Pyridium , patient said medication is not working wants to know if you can change it or order Urine culture   Please Advice